# Patient Record
Sex: FEMALE | Race: WHITE | NOT HISPANIC OR LATINO | Employment: OTHER | URBAN - METROPOLITAN AREA
[De-identification: names, ages, dates, MRNs, and addresses within clinical notes are randomized per-mention and may not be internally consistent; named-entity substitution may affect disease eponyms.]

---

## 2017-01-25 NOTE — TELEPHONE ENCOUNTER
----- Message from Lucy Salgado sent at 1/25/2017  9:42 AM CST -----  Patient requesting a Rx for Eloquis. States her pharmacy is advising her PCP will no longer fill this medication.    Pt use..    [facility] - [facility] [address] [place] [id number] [address] [address]  Phone: [phone] Fax: [phone]    Please adv/call [phone].//thanks. cw

## 2017-04-19 DIAGNOSIS — G45.9 TRANSIENT CEREBRAL ISCHEMIA, UNSPECIFIED TYPE: Primary | ICD-10-CM

## 2017-04-19 DIAGNOSIS — G45.1 CAROTID ARTERY SYNDROME: ICD-10-CM

## 2017-04-20 ENCOUNTER — CLINICAL SUPPORT (OUTPATIENT)
Dept: CARDIOLOGY | Facility: CLINIC | Age: 77
End: 2017-04-20
Payer: MEDICARE

## 2017-04-20 ENCOUNTER — OFFICE VISIT (OUTPATIENT)
Dept: CARDIOLOGY | Facility: CLINIC | Age: 77
End: 2017-04-20
Payer: MEDICARE

## 2017-04-20 VITALS
BODY MASS INDEX: 26.41 KG/M2 | HEART RATE: 68 BPM | SYSTOLIC BLOOD PRESSURE: 150 MMHG | WEIGHT: 149.06 LBS | DIASTOLIC BLOOD PRESSURE: 60 MMHG | HEIGHT: 63 IN

## 2017-04-20 DIAGNOSIS — Z45.010 PACEMAKER AT END OF BATTERY LIFE: Primary | ICD-10-CM

## 2017-04-20 DIAGNOSIS — G45.1 CAROTID ARTERY SYNDROME: ICD-10-CM

## 2017-04-20 DIAGNOSIS — I67.3: ICD-10-CM

## 2017-04-20 DIAGNOSIS — I48.0 PAROXYSMAL ATRIAL FIBRILLATION: Chronic | ICD-10-CM

## 2017-04-20 DIAGNOSIS — E78.5 DYSLIPIDEMIA: Chronic | ICD-10-CM

## 2017-04-20 DIAGNOSIS — G45.9 TRANSIENT CEREBRAL ISCHEMIA, UNSPECIFIED TYPE: ICD-10-CM

## 2017-04-20 DIAGNOSIS — E78.5 HYPERLIPIDEMIA, UNSPECIFIED HYPERLIPIDEMIA TYPE: ICD-10-CM

## 2017-04-20 DIAGNOSIS — R06.09 DOE (DYSPNEA ON EXERTION): ICD-10-CM

## 2017-04-20 DIAGNOSIS — I63.81: ICD-10-CM

## 2017-04-20 DIAGNOSIS — Z95.0 CARDIAC PACEMAKER IN SITU: ICD-10-CM

## 2017-04-20 DIAGNOSIS — I48.0 PAF (PAROXYSMAL ATRIAL FIBRILLATION): ICD-10-CM

## 2017-04-20 DIAGNOSIS — Z79.01 ANTICOAGULATED: ICD-10-CM

## 2017-04-20 DIAGNOSIS — I16.0 HYPERTENSIVE URGENCY: ICD-10-CM

## 2017-04-20 DIAGNOSIS — I10 ESSENTIAL (PRIMARY) HYPERTENSION: Chronic | ICD-10-CM

## 2017-04-20 PROCEDURE — 1126F AMNT PAIN NOTED NONE PRSNT: CPT | Mod: S$GLB,,, | Performed by: PHYSICIAN ASSISTANT

## 2017-04-20 PROCEDURE — 1160F RVW MEDS BY RX/DR IN RCRD: CPT | Mod: S$GLB,,, | Performed by: PHYSICIAN ASSISTANT

## 2017-04-20 PROCEDURE — 93280 PM DEVICE PROGR EVAL DUAL: CPT | Mod: 26,,, | Performed by: INTERNAL MEDICINE

## 2017-04-20 PROCEDURE — 1159F MED LIST DOCD IN RCRD: CPT | Mod: S$GLB,,, | Performed by: PHYSICIAN ASSISTANT

## 2017-04-20 PROCEDURE — 93000 ELECTROCARDIOGRAM COMPLETE: CPT | Mod: S$GLB,,, | Performed by: INTERNAL MEDICINE

## 2017-04-20 PROCEDURE — 3078F DIAST BP <80 MM HG: CPT | Mod: S$GLB,,, | Performed by: PHYSICIAN ASSISTANT

## 2017-04-20 PROCEDURE — 3077F SYST BP >= 140 MM HG: CPT | Mod: S$GLB,,, | Performed by: PHYSICIAN ASSISTANT

## 2017-04-20 PROCEDURE — 99999 PR PBB SHADOW E&M-EST. PATIENT-LVL III: CPT | Mod: PBBFAC,,, | Performed by: PHYSICIAN ASSISTANT

## 2017-04-20 PROCEDURE — 99215 OFFICE O/P EST HI 40 MIN: CPT | Mod: S$GLB,,, | Performed by: PHYSICIAN ASSISTANT

## 2017-04-20 RX ORDER — NITROFURANTOIN MACROCRYSTALS 50 MG/1
CAPSULE ORAL
Refills: 5 | COMMUNITY
Start: 2017-04-10 | End: 2019-02-25 | Stop reason: ALTCHOICE

## 2017-04-20 NOTE — PROGRESS NOTES
"Subjective:    Patient ID:  Tatiana Chaparro is a 76 y.o. female who presents for follow-up of PAF, HTN, hyperlipidemia    HPI   Ms. Chaparro is a 76 year old female patient with a PMHx ? Binswanger's disease, HTN, PAF (on Eliquis), and dyslipidemia who presents today for routine follow-up. Patient returns today and states she is doing ok. She complained of recent increased BROOKE over the past 2-3 months. She has noticed a decline in her energy level, states she gets tired and "winded" even doing housework. Associated symptoms include occasional palpitations. Patient denies any associated zaria chest pain or tightness. No lightheadedness, dizziness, near syncope, or syncope. She reports compliance with her medications. Remains on Eliquis for CVA prophylaxis. No bleeding issues. No stroke like symptoms. Device interrogation today revealed AMRGIE (since 11/16). Discussed with EP specialist, Dr. Dias in clinic. Will stop atenolol to give buffer for underlying rhythm and plan for generator change Thursday, 4/27/16.      Review of Systems   Constitution: Positive for malaise/fatigue. Negative for chills, decreased appetite, fever and weakness.   HENT: Negative for congestion, headaches, hoarse voice and sore throat.    Eyes: Negative for blurred vision and discharge.   Cardiovascular: Positive for dyspnea on exertion. Negative for chest pain, claudication, cyanosis, irregular heartbeat, leg swelling, near-syncope, orthopnea, palpitations and paroxysmal nocturnal dyspnea.   Respiratory: Positive for shortness of breath. Negative for cough, hemoptysis, snoring, sputum production and wheezing.    Endocrine: Negative for cold intolerance and heat intolerance.   Hematologic/Lymphatic: Negative for bleeding problem. Does not bruise/bleed easily.   Skin: Negative for rash.   Musculoskeletal: Negative for arthritis, back pain, joint pain, joint swelling, muscle cramps, muscle weakness and myalgias.   Gastrointestinal: Negative " "for abdominal pain, constipation, diarrhea, heartburn, melena and nausea.   Genitourinary: Negative for hematuria.   Neurological: Negative for dizziness, focal weakness, light-headedness, loss of balance, numbness, paresthesias and seizures.   Psychiatric/Behavioral: Negative for memory loss. The patient does not have insomnia.    Allergic/Immunologic: Negative for hives.       BP (!) 150/60 (BP Location: Right arm, Patient Position: Sitting, BP Method: Manual)  Pulse 68 Comment: radial  Ht 5' 3" (1.6 m)  Wt 67.6 kg (149 lb 0.5 oz)  BMI 26.4 kg/m2    Objective:    Physical Exam   Constitutional: She is oriented to person, place, and time. She appears well-developed and well-nourished. No distress.   HENT:   Head: Normocephalic and atraumatic.   Eyes: Pupils are equal, round, and reactive to light. Right eye exhibits no discharge. Left eye exhibits no discharge.   Neck: Neck supple. No JVD present. No tracheal deviation present. No thyromegaly present.   Cardiovascular: Normal rate, S1 normal, S2 normal and normal heart sounds.  An irregularly irregular rhythm present. PMI is not displaced.  Exam reveals no gallop, no S3, no S4 and no friction rub.    No murmur heard.  Pulses:       Radial pulses are 2+ on the right side, and 2+ on the left side.   Pulmonary/Chest: Effort normal and breath sounds normal. No respiratory distress. She has no wheezes. She has no rales.   Ppm site well-healed   Abdominal: Soft. She exhibits no distension. There is no tenderness. There is no rebound.   Musculoskeletal: She exhibits no edema.   Neurological: She is alert and oriented to person, place, and time.   Skin: Skin is warm and dry. She is not diaphoretic. No erythema.   Psychiatric: She has a normal mood and affect. Her behavior is normal. Thought content normal.   Nursing note and vitals reviewed.        Lab Results   Component Value Date    CHOL 136 07/05/2016     Lab Results   Component Value Date    HDL 38 (L) 07/05/2016 "     Lab Results   Component Value Date    LDLCALC 66.4 07/05/2016     Lab Results   Component Value Date    TRIG 158 (H) 07/05/2016     Lab Results   Component Value Date    CHOLHDL 27.9 07/05/2016       Chemistry        Component Value Date/Time     07/04/2016 2225    K 4.0 07/04/2016 2225     07/04/2016 2225    CO2 25 07/04/2016 2225    BUN 39 (H) 07/04/2016 2225    CREATININE 1.4 07/04/2016 2225     (H) 07/04/2016 2225        Component Value Date/Time    CALCIUM 10.1 07/04/2016 2225    ALKPHOS 74 07/04/2016 2225    AST 23 07/04/2016 2225    ALT 29 07/04/2016 2225    BILITOT 0.3 07/04/2016 2225        Lab Results   Component Value Date    HGBA1C 7.2 (H) 07/05/2016       Assessment:       1. Pacemaker at end of battery life    2. Binswanger's disease    3. Lacunar stroke of right subthalamic region    4. Transient cerebral ischemia, unspecified type    5. Essential (primary) hypertension    6. Hypertensive urgency    7. Paroxysmal atrial fibrillation    8. Dyslipidemia    9. Hyperlipidemia, unspecified hyperlipidemia type    10. Anticoagulated    11. BROOKE (dyspnea on exertion)       Patient who presents for routine follow-up, device check subsequently revealed MARGIE, needs generator change. Discussed with Dr. Dias, will stop atenolol and plan for generator change 4/27/17. Patient also with BROOKE and fatigue, may be related to pacemaker issue whoever given risk factors and history, needs MPI and stress test post-generator change. Continue current meds in meantime. Patient also with history of transient cerebral ischemia/ lacunar stroke. Generally we stop Eliquis pre-procedure, will make sure Dr. Dias is aware of stroke history and defer to him. Patient advised to go to ED with any concerning symptoms.   Plan:   -Needs PPM generator change as device if MARGIE, planned for 4/27/17  -Stop atenolol  -Continue other meds, defer timeline of stopping Eliquis to Dr. Dias  -Stress test and 2D echo  post-generator change    Chart reviewed. Dr. Riley agrees with plan as outlined above.

## 2017-04-20 NOTE — MR AVS SNAPSHOT
[facility]  [id number] [place] [id number]  Phone: [phone]  Fax: [phone]                  Tatiana Chaparro   2017 1:30 PM   Office Visit    Description:  Female : 1940   Provider:  AMALIA Moon   Department:  [facility]                To Do List           Goals (5 Years of Data)     None      [facility] On Call     [facility] On Call Nurse Care Line -  Assistance  Unless otherwise directed by your provider, please contact [facility] On-Call, our nurse care line that is available for  assistance.     Registered nurses in the [facility] On Call Center provide: appointment scheduling, clinical advisement, health education, and other advisory services.  Call: [phone] (toll free)               Medications           Message regarding Medications     Verify the changes and/or additions to your medication regime listed below are the same as discussed with your clinician today.  If any of these changes or additions are incorrect, please notify your healthcare provider.             Verify that the below list of medications is an accurate representation of the medications you are currently taking.  If none reported, the list may be blank. If incorrect, please contact your healthcare provider. Carry this list with you in case of emergency.           Current Medications     alprazolam (XANAX) 0.5 MG tablet Take 0.5 mg by mouth every evening.     apixaban 5 mg Tab Take 1 tablet (5 mg total) by mouth 2 (two) times daily.    atenolol (TENORMIN) 25 MG tablet Take 1 tablet (25 mg total) by mouth once daily.    atorvastatin (LIPITOR) 10 MG tablet Take 10 mg by mouth once daily.    gabapentin (NEURONTIN) 100 MG capsule Take 100 mg by mouth 2 (two) times daily. Takes one tablet by mouth in the morning and two tablets at night    hydrochlorothiazide (HYDRODIURIL) 25 MG tablet Take 25 mg by mouth once daily.    metformin (GLUCOPHAGE) 500 MG tablet Take 500  "mg by mouth 2 (two) times daily with meals.    nitrofurantoin (MACRODANTIN) 50 MG capsule TAKE ONE CAPSULE BY MOUTH AT BEDTIME THANK YOU    quinapril (ACCUPRIL) 40 MG tablet Take 40 mg by mouth 2 (two) times daily.           Clinical Reference Information           Your Vitals Were     BP Pulse Height Weight BMI    150/60 (BP Location: Right arm, Patient Position: Sitting, BP Method: Manual) 68 5' 3" (1.6 m) 67.6 kg (149 lb 0.5 oz) 26.4 kg/m2      Blood Pressure          Most Recent Value    BP  (!)  150/60      Allergies as of 4/20/2017     Codeine    Morphine      Immunizations Administered on Date of Encounter - 4/20/2017     None      [facility] Sign-Up     Activating your [facility] account is as easy as 1-2-3!     1) Visit [email or web address], select Sign Up Now, enter this activation code and your date of birth, then select Next.  [email or web address]  Expires: 6/4/2017  2:16 PM      2) Create a username and password to use when you visit [facility] in the future and select a security question in case you lose your password and select Next.    3) Enter your e-mail address and click Sign Up!    Additional Information  If you have questions, please e-mail [email or web address] or call [phone] to talk to our [facility] staff. Remember, [facility] is NOT to be used for urgent needs. For medical emergencies, dial 911.         Language Assistance Services     ATTENTION: Language assistance services are available, free of charge. Please call [phone].      ATENCIÓN: Si habla español, tiene a franklin disposición servicios gratuitos de asistencia lingüística. Llame al [phone].     [facility] Ý: N?u b?n nói Ti?ng Vi?t, có các d?ch v? h? tr? ngôn ng? mi?n phí dành cho b?n. G?i s? [phone].         [facility] complies with applicable Federal civil rights laws and does not discriminate on the basis of race, color, national origin, age, disability, or sex.        "

## 2017-04-21 PROBLEM — Z45.010 PACEMAKER AT END OF BATTERY LIFE: Status: ACTIVE | Noted: 2017-04-21

## 2017-04-21 PROBLEM — R06.09 DOE (DYSPNEA ON EXERTION): Status: ACTIVE | Noted: 2017-04-21

## 2017-04-24 ENCOUNTER — TELEPHONE (OUTPATIENT)
Dept: CARDIOLOGY | Facility: CLINIC | Age: 77
End: 2017-04-24

## 2017-04-24 DIAGNOSIS — Z45.010 PACEMAKER GENERATOR END OF LIFE: Primary | ICD-10-CM

## 2017-04-24 DIAGNOSIS — I48.0 PAF (PAROXYSMAL ATRIAL FIBRILLATION): ICD-10-CM

## 2017-04-25 ENCOUNTER — LAB VISIT (OUTPATIENT)
Dept: LAB | Facility: HOSPITAL | Age: 77
End: 2017-04-25
Attending: INTERNAL MEDICINE
Payer: MEDICARE

## 2017-04-25 DIAGNOSIS — Z45.010 PACEMAKER GENERATOR END OF LIFE: ICD-10-CM

## 2017-04-25 DIAGNOSIS — I48.0 PAF (PAROXYSMAL ATRIAL FIBRILLATION): ICD-10-CM

## 2017-04-25 LAB
ANION GAP SERPL CALC-SCNC: 11 MMOL/L
APTT BLDCRRT: 24.9 SEC
BASOPHILS # BLD AUTO: 0.03 K/UL
BASOPHILS NFR BLD: 0.4 %
BUN SERPL-MCNC: 27 MG/DL
CALCIUM SERPL-MCNC: 9.8 MG/DL
CHLORIDE SERPL-SCNC: 102 MMOL/L
CO2 SERPL-SCNC: 26 MMOL/L
CREAT SERPL-MCNC: 1.1 MG/DL
DIFFERENTIAL METHOD: ABNORMAL
EOSINOPHIL # BLD AUTO: 0.2 K/UL
EOSINOPHIL NFR BLD: 2.2 %
ERYTHROCYTE [DISTWIDTH] IN BLOOD BY AUTOMATED COUNT: 13.3 %
EST. GFR  (AFRICAN AMERICAN): 55.6 ML/MIN/1.73 M^2
EST. GFR  (NON AFRICAN AMERICAN): 48.2 ML/MIN/1.73 M^2
GLUCOSE SERPL-MCNC: 124 MG/DL
HCT VFR BLD AUTO: 37.7 %
HGB BLD-MCNC: 12.5 G/DL
INR PPP: 1.1
LYMPHOCYTES # BLD AUTO: 2.1 K/UL
LYMPHOCYTES NFR BLD: 25.6 %
MCH RBC QN AUTO: 31.5 PG
MCHC RBC AUTO-ENTMCNC: 33.2 %
MCV RBC AUTO: 95 FL
MONOCYTES # BLD AUTO: 0.8 K/UL
MONOCYTES NFR BLD: 9.7 %
NEUTROPHILS # BLD AUTO: 5.1 K/UL
NEUTROPHILS NFR BLD: 61.5 %
PLATELET # BLD AUTO: 258 K/UL
PMV BLD AUTO: 11.2 FL
POTASSIUM SERPL-SCNC: 4 MMOL/L
PROTHROMBIN TIME: 11.2 SEC
RBC # BLD AUTO: 3.97 M/UL
SODIUM SERPL-SCNC: 139 MMOL/L
WBC # BLD AUTO: 8.28 K/UL

## 2017-04-25 PROCEDURE — 85025 COMPLETE CBC W/AUTO DIFF WBC: CPT

## 2017-04-25 PROCEDURE — 85730 THROMBOPLASTIN TIME PARTIAL: CPT

## 2017-04-25 PROCEDURE — 36415 COLL VENOUS BLD VENIPUNCTURE: CPT

## 2017-04-25 PROCEDURE — 80048 BASIC METABOLIC PNL TOTAL CA: CPT

## 2017-04-25 PROCEDURE — 85610 PROTHROMBIN TIME: CPT

## 2017-04-26 DIAGNOSIS — I48.0 PAF (PAROXYSMAL ATRIAL FIBRILLATION): ICD-10-CM

## 2017-04-26 DIAGNOSIS — Z45.010 PACEMAKER GENERATOR END OF LIFE: Primary | ICD-10-CM

## 2017-04-26 NOTE — TELEPHONE ENCOUNTER
----- Message from Kendrick Dias MD sent at 4/26/2017  1:58 PM CDT -----  Correct. Moderate sedation ok. MB  ----- Message -----     From: Danelle Tello LPN     Sent: 4/26/2017   1:46 PM       To: Kendrick Dias MD    No anesthesia for this case MDT gen change 1PM tomorrow ?    Thanks, Danelle  ----- Message -----     From: AMALIA Moon     Sent: 4/21/2017   3:17 PM       To: Danelle Tello LPN

## 2017-04-26 NOTE — TELEPHONE ENCOUNTER
Telephoned patient on 4/24/17 and confirmed Generator change with Dr. Dias on Thursday scheduled lab work and reviewed Npo status, medications to hold prior to procedure and arrival time.

## 2017-04-27 ENCOUNTER — HOSPITAL ENCOUNTER (OUTPATIENT)
Facility: HOSPITAL | Age: 77
Discharge: HOME | End: 2017-04-27
Attending: INTERNAL MEDICINE | Admitting: INTERNAL MEDICINE
Payer: MEDICARE

## 2017-04-27 ENCOUNTER — SURGERY (OUTPATIENT)
Age: 77
End: 2017-04-27

## 2017-04-27 VITALS
OXYGEN SATURATION: 98 % | TEMPERATURE: 98 F | HEART RATE: 70 BPM | HEIGHT: 64 IN | WEIGHT: 153 LBS | DIASTOLIC BLOOD PRESSURE: 65 MMHG | SYSTOLIC BLOOD PRESSURE: 160 MMHG | RESPIRATION RATE: 17 BRPM | BODY MASS INDEX: 26.12 KG/M2

## 2017-04-27 DIAGNOSIS — I48.0 PAF (PAROXYSMAL ATRIAL FIBRILLATION): ICD-10-CM

## 2017-04-27 DIAGNOSIS — I49.8 SINUS ARRHYTHMIA: ICD-10-CM

## 2017-04-27 DIAGNOSIS — Z45.018 ENCOUNTER FOR ADJUSTMENT AND MANAGEMENT OF OTHER PART OF CARDIAC PACEMAKER: ICD-10-CM

## 2017-04-27 DIAGNOSIS — Z45.010 PACEMAKER GENERATOR END OF LIFE: ICD-10-CM

## 2017-04-27 PROCEDURE — 99152 MOD SED SAME PHYS/QHP 5/>YRS: CPT | Mod: ,,, | Performed by: INTERNAL MEDICINE

## 2017-04-27 PROCEDURE — 27200137 EP LAB PROCEDURE

## 2017-04-27 PROCEDURE — 63600175 PHARM REV CODE 636 W HCPCS: Performed by: INTERNAL MEDICINE

## 2017-04-27 PROCEDURE — 25000003 PHARM REV CODE 250

## 2017-04-27 PROCEDURE — 33228 REMV&REPLC PM GEN DUAL LEAD: CPT | Mod: ,,, | Performed by: INTERNAL MEDICINE

## 2017-04-27 PROCEDURE — 63600175 PHARM REV CODE 636 W HCPCS

## 2017-04-27 RX ORDER — CEFAZOLIN SODIUM 2 G/50ML
2 SOLUTION INTRAVENOUS
Status: COMPLETED | OUTPATIENT
Start: 2017-04-27 | End: 2017-04-27

## 2017-04-27 RX ORDER — OXYCODONE AND ACETAMINOPHEN 7.5; 325 MG/1; MG/1
1 TABLET ORAL EVERY 4 HOURS PRN
Qty: 15 TABLET | Refills: 0 | Status: SHIPPED | OUTPATIENT
Start: 2017-04-27 | End: 2017-05-07

## 2017-04-27 RX ORDER — CEPHALEXIN 500 MG/1
500 CAPSULE ORAL EVERY 8 HOURS
Qty: 12 CAPSULE | Refills: 0 | Status: SHIPPED | OUTPATIENT
Start: 2017-04-27 | End: 2017-05-01

## 2017-04-27 RX ORDER — ACETAMINOPHEN 325 MG/1
325 TABLET ORAL EVERY 6 HOURS PRN
Status: DISCONTINUED | OUTPATIENT
Start: 2017-04-27 | End: 2017-04-27 | Stop reason: HOSPADM

## 2017-04-27 RX ORDER — ONDANSETRON 2 MG/ML
4 INJECTION INTRAMUSCULAR; INTRAVENOUS ONCE
Status: COMPLETED | OUTPATIENT
Start: 2017-04-27 | End: 2017-04-27

## 2017-04-27 RX ADMIN — CEFAZOLIN SODIUM 2 G: 2 SOLUTION INTRAVENOUS at 01:04

## 2017-04-27 RX ADMIN — ONDANSETRON 4 MG: 2 INJECTION INTRAMUSCULAR; INTRAVENOUS at 03:04

## 2017-04-27 NOTE — DISCHARGE INSTRUCTIONS
ACTIVITY: Avoid heavy lifting or straining for 6 weeks          You may shower after 2 days          No tub bath until incision site is healed                     No reaching movements above the affected shoulder for 7 days                     Wear sling and swathe for ____ days.                        WOUND CARE: It is not unusual to have tenderness at the incision site. Remove the dressing as instructed by your physician.    DISCOMFORT: For general discomfort at the incision site, ou may take over the counter acetaminophen as directed on the bottle.    SIGNS AND SYMPTOMS TO REPORT: Call your physician for the following:          Fever greater than 101          Pain not relieved by medication          Swelling, drainage, warmth, or redness at incision site          Shortness of breath                     Hiccoughs within the first 48 hours that are persistent                     Increased fatigue with normal activity    WHEN TO SEEK EMERGENCY ASSISTANCE                      AFTER ICD CALL 911 FOR THE FOLLOWING:                     If you still have symptoms after shock                     If you have 3 or more shocks in a row                     If you have symptoms, but don't feel a shock, or if you feel faint or dizzy or                      Pass out, someone should call 911

## 2017-04-27 NOTE — H&P
[facility] -   History & Physical    Subjective:      Chief Complaint/Reason for Admission: PM at MARGIE    Tatiana Chaparro is a 76 y.o. female Binswanger's disease, HTN, PAF (on Eliquis), s/p DC PM here for PM generator change.  Device interrogation today revealed MARGIE (since 11/16). Stopped atenolol atenolol to give buffer for underlying rhythm. The patient denies interval chest pain, sob, palpitations, syncope, near syncope. Off eliquis since 4/24/17.         Past Medical History:   Diagnosis Date    Atrial fibrillation     Chronic back pain     Hypertension      Past Surgical History:   Procedure Laterality Date    APPENDECTOMY      CARDIAC PACEMAKER PLACEMENT      CHOLECYSTECTOMY      HYSTERECTOMY       History reviewed. No pertinent family history.  Social History   Substance Use Topics    Smoking status: Never Smoker    Smokeless tobacco: None    Alcohol use No       PTA Medications   Medication Sig    alprazolam (XANAX) 0.5 MG tablet Take 0.5 mg by mouth every evening.     atorvastatin (LIPITOR) 10 MG tablet Take 10 mg by mouth once daily.    gabapentin (NEURONTIN) 100 MG capsule Take 100 mg by mouth 2 (two) times daily. Takes one tablet by mouth in the morning and two tablets at night    hydrochlorothiazide (HYDRODIURIL) 25 MG tablet Take 25 mg by mouth once daily.    metformin (GLUCOPHAGE) 500 MG tablet Take 500 mg by mouth 2 (two) times daily with meals.    nitrofurantoin (MACRODANTIN) 50 MG capsule TAKE ONE CAPSULE BY MOUTH AT BEDTIME THANK YOU    quinapril (ACCUPRIL) 40 MG tablet Take 40 mg by mouth 2 (two) times daily.    apixaban 5 mg Tab Take 1 tablet (5 mg total) by mouth 2 (two) times daily.    atenolol (TENORMIN) 25 MG tablet Take 1 tablet (25 mg total) by mouth once daily.     Review of patient's allergies indicates:   Allergen Reactions    Codeine Itching    Morphine Itching        Review of Systems   Constitutional: Negative.    HENT: Negative.    Eyes:  Negative.    Respiratory: Negative.    Cardiovascular: Negative.    Gastrointestinal: Negative.    Genitourinary: Negative.    Musculoskeletal: Negative.    Skin: Negative.    Neurological: Negative.    Endo/Heme/Allergies: Negative.    Psychiatric/Behavioral: Negative.        Objective:      Vital Signs (Most Recent)  Temp: 97.7 °F (36.5 °C) (04/27/17 1205)  Pulse: 66 (04/27/17 1205)  Resp: 16 (04/27/17 1205)  BP: (!) 151/75 (04/27/17 1205)  SpO2: 99 % (04/27/17 1205)    Vital Signs Range (Last 24H):  Temp:  [97.7 °F (36.5 °C)]   Pulse:  [66]   Resp:  [16]   BP: (151)/(75)   SpO2:  [99 %]     Physical Exam   Constitutional: She is oriented to person, place, and time. She appears well-developed and well-nourished. No distress.   HENT:   Head: Normocephalic and atraumatic.   Mouth/Throat: No oropharyngeal exudate.   Eyes: EOM are normal. Pupils are equal, round, and reactive to light. Right eye exhibits no discharge. Left eye exhibits no discharge.   Neck: Normal range of motion. Neck supple. No JVD present. No thyromegaly present.   Cardiovascular: Normal rate and regular rhythm.    No murmur heard.  Pulmonary/Chest: Effort normal and breath sounds normal. No respiratory distress. She has no wheezes.   Abdominal: Soft. Bowel sounds are normal. She exhibits no distension and no mass. There is no tenderness. There is no guarding.   Musculoskeletal: Normal range of motion. She exhibits no edema.   Neurological: She is alert and oriented to person, place, and time. She has normal reflexes. No cranial nerve deficit. Coordination normal.   Skin: Skin is warm and dry. She is not diaphoretic. No erythema. No pallor.   Psychiatric: She has a normal mood and affect. Her behavior is normal. Judgment and thought content normal.   Vitals reviewed.      Data Review:    CBC:   Lab Results   Component Value Date    WBC 8.28 04/25/2017    RBC 3.97 (L) 04/25/2017    HGB 12.5 04/25/2017    HCT 37.7 04/25/2017     04/25/2017      BMP:   Lab Results   Component Value Date     (H) 04/25/2017     04/25/2017    K 4.0 04/25/2017     04/25/2017    CO2 26 04/25/2017    BUN 27 (H) 04/25/2017    CREATININE 1.1 04/25/2017    CALCIUM 9.8 04/25/2017     Coagulation:   Lab Results   Component Value Date    INR 1.1 04/25/2017    APTT 24.9 04/25/2017      ECG: AF with V pacing.     Assessment:      Active Hospital Problems    Diagnosis  POA    Pacemaker generator end of life [Z45.010]  Not Applicable     Priority: High      Resolved Hospital Problems    Diagnosis Date Resolved POA   No resolved problems to display.       Plan:    76 yoF AF s/p PM here for generator change due to MARGIE status. Extensive discussion regarding risks, benefits, and alternative approaches to the procedure was had with the patient.  The patient voices understanding and all questions have been answered.  The patient would like to proceed as planned.

## 2017-04-27 NOTE — IP AVS SNAPSHOT
[facility]  [id number] [place] [address] [address] [place] [id number]           Patient Discharge Instructions   Our goal is to set you up for success. This packet includes information on your condition, medications, and your home care.  It will help you care for yourself to prevent having to return to the hospital.     Please ask your nurse if you have any questions.      There are many details to remember when preparing to leave the hospital. Here is what you will need to do:    1. Take your medicine. If you are prescribed medications, review your Medication List on the following pages. You may have new medications to  at the pharmacy and others that you'll need to stop taking. Review the instructions for how and when to take your medications. Talk with your doctor or nurses if you are unsure of what to do.     2. Go to your follow-up appointments. Specific follow-up information is listed in the following pages. Your may be contacted by a nurse or clinical provider about future appointments. Be sure we have all of the phone numbers to reach you. Please contact your provider's office if you are unable to make an appointment.     3. Watch for warning signs. Your doctor or nurse will give you detailed warning signs to watch for and when to call for assistance. These instructions may also include educational information about your condition. If you experience any of warning signs to your health, call your doctor.           [facility]  Unless otherwise directed by your provider, please   contact [facility], our nurse care line   that is available for 24/7 assistance.     [phone] (toll-free)     Registered nurses in the [facility]   provide: appointment scheduling, clinical advisement, health education, and other advisory services.                  ** Verify the list of medication(s) below is accurate and up to date. Carry this with you in case of emergency. If your  medications have changed, please notify your healthcare provider.             Medication List      START taking these medications        Additional Info                      cephALEXin 500 MG capsule   Commonly known as:  KEFLEX   Quantity:  12 capsule   Refills:  0   Dose:  500 mg    Instructions:  Take 1 capsule (500 mg total) by mouth every 8 (eight) hours.     Begin Date    AM    Noon    PM    Bedtime       oxycodone-acetaminophen 7.5-325 mg per tablet   Commonly known as:  PERCOCET   Quantity:  15 tablet   Refills:  0   Dose:  1 tablet    Instructions:  Take 1 tablet by mouth every 4 (four) hours as needed for Pain.     Begin Date    AM    Noon    PM    Bedtime         ASK your doctor about these medications        Additional Info                      alprazolam 0.5 MG tablet   Commonly known as:  XANAX   Refills:  0   Dose:  0.5 mg    Instructions:  Take 0.5 mg by mouth every evening.     Begin Date    AM    Noon    PM    Bedtime       apixaban 5 mg Tab   Quantity:  60 tablet   Refills:  3   Dose:  5 mg    Instructions:  Take 1 tablet (5 mg total) by mouth 2 (two) times daily.     Begin Date    AM    Noon    PM    Bedtime       atenolol 25 MG tablet   Commonly known as:  TENORMIN   Quantity:  30 tablet   Refills:  1   Dose:  25 mg    Instructions:  Take 1 tablet (25 mg total) by mouth once daily.     Begin Date    AM    Noon    PM    Bedtime       atorvastatin 10 MG tablet   Commonly known as:  LIPITOR   Refills:  0   Dose:  10 mg    Instructions:  Take 10 mg by mouth once daily.     Begin Date    AM    Noon    PM    Bedtime       gabapentin 100 MG capsule   Commonly known as:  NEURONTIN   Refills:  0   Dose:  100 mg    Instructions:  Take 100 mg by mouth 2 (two) times daily. Takes one tablet by mouth in the morning and two tablets at night     Begin Date    AM    Noon    PM    Bedtime       hydrochlorothiazide 25 MG tablet   Commonly known as:  HYDRODIURIL   Refills:  0   Dose:  25 mg    Instructions:  Take  25 mg by mouth once daily.     Begin Date    AM    Noon    PM    Bedtime       metformin 500 MG tablet   Commonly known as:  GLUCOPHAGE   Refills:  0   Dose:  500 mg    Instructions:  Take 500 mg by mouth 2 (two) times daily with meals.     Begin Date    AM    Noon    PM    Bedtime       nitrofurantoin 50 MG capsule   Commonly known as:  MACRODANTIN   Refills:  5    Instructions:  TAKE ONE CAPSULE BY MOUTH AT BEDTIME THANK YOU     Begin Date    AM    Noon    PM    Bedtime       quinapril 40 MG tablet   Commonly known as:  ACCUPRIL   Refills:  0   Dose:  40 mg    Instructions:  Take 40 mg by mouth 2 (two) times daily.     Begin Date    AM    Noon    PM    Bedtime            Where to Get Your Medications      You can get these medications from any pharmacy     Bring a paper prescription for each of these medications     cephALEXin 500 MG capsule    oxycodone-acetaminophen 7.5-325 mg per tablet                  Please bring to all follow up appointments:    1. A copy of your discharge instructions.  2. All medicines you are currently taking in their original bottles.  3. Identification and insurance card.    Please arrive 15 minutes ahead of scheduled appointment time.    Please call 24 hours in advance if you must reschedule your appointment and/or time.        Your Scheduled Appointments     May 01, 2017 11:00 AM CDT   Pacemaker Wound Check with [facility]   [facility] ([facility])    [id number] [place] [address] , [address]  [place] [address] [phone]              Follow-up Information     Follow up with [facility] In 2 days.    Why:  For wound re-check          Discharge Instructions           ACTIVITY: Avoid heavy lifting or straining for 6 weeks          You may shower after 2 days          No tub bath until incision site is healed                     No reaching movements above the affected shoulder for 7 days                     Wear  "sling and swathe for ____ days.                        WOUND CARE: It is not unusual to have tenderness at the incision site. Remove the dressing as instructed by your physician.    DISCOMFORT: For general discomfort at the incision site, ou may take over the counter acetaminophen as directed on the bottle.    SIGNS AND SYMPTOMS TO REPORT: Call your physician for the following:          Fever greater than 101          Pain not relieved by medication          Swelling, drainage, warmth, or redness at incision site          Shortness of breath                     Hiccoughs within the first 48 hours that are persistent                     Increased fatigue with normal activity    WHEN TO SEEK EMERGENCY ASSISTANCE                      AFTER ICD CALL 911 FOR THE FOLLOWING:                     If you still have symptoms after shock                     If you have 3 or more shocks in a row                     If you have symptoms, but don't feel a shock, or if you feel faint or dizzy or                      Pass out, someone should call 911                                                         Admission Information     Date & Time Provider Department CSN    4/27/2017 11:34 AM Kendrick Dias MD [facility] -  [id number]      Care Providers     Provider Role Specialty Primary office phone    Kendrick Dias MD Attending Provider Electrophysiology [phone]    Kendrick Dias MD Surgeon  Electrophysiology [phone]      Your Vitals Were     BP Pulse Temp Resp Height Weight    165/59 74 97.8 °F (36.6 °C) (Oral) 10 5' 3.5" (1.613 m) 69.4 kg (153 lb)    SpO2 BMI             95% 26.68 kg/m2         Recent Lab Values        7/5/2016                           2:18 AM           A1C 7.2 (H)                       Allergies as of 4/27/2017        Reactions    Codeine Itching    Morphine Itching      Advance Directives     An advance directive is a document which, in the event you are no longer able to " make decisions for yourself, tells your healthcare team what kind of treatment you do or do not want to receive, or who you would like to make those decisions for you.  If you do not currently have an advance directive, [facility] encourages you to create one.  For more information call:  [phone]), [phone] ([phone]),  or log on to [email or web address].        Language Assistance Services     ATTENTION: Language assistance services are available, free of charge. Please call [phone].      ATENCIÓN: Si habla español, tiene a franklin disposición servicios gratuitos de asistencia lingüística. Llame al [phone].     CHÚ Ý: N?u b?n nói Ti?ng Vi?t, có các d?ch v? h? tr? ngôn ng? mi?n phí dành cho b?n. G?i s? [phone].        Stroke Education              Diabetes Discharge Instructions                                   Eliquis Informaiton           [facility] Sign-Up     Activating your [facility] account is as easy as 1-2-3!     1) Visit [email or web address], select Sign Up Now, enter this activation code and your date of birth, then select Next.  [email or web address]  Expires: 6/4/2017  2:16 PM      2) Create a username and password to use when you visit [facility] in the future and select a security question in case you lose your password and select Next.    3) Enter your e-mail address and click Sign Up!    Additional Information  If you have questions, please e-mail [email or web address] or call [phone] to talk to our [facility] staff. Remember, [facility] is NOT to be used for urgent needs. For medical emergencies, dial 911.          [facility] complies with applicable Federal civil rights laws and does not discriminate on the basis of race, color, national origin, age, disability, or sex.

## 2017-04-27 NOTE — BRIEF OP NOTE
: Kendrick Dias    Post-operative Diagnosis: S/p dual chamber PM generator change    Procedure Performed: DC PM generator change      Description of Procedure: After the right upper chest was infiltrated with lidocaine,the pre-pectoral pocket was opened. The existing generator was freed from the pocket. The old generator was removed and the new generator was attached to the leads.  Adequate lead parameters were obtained. The generator was placed in the pocket and the pocket was washed with antibiotic solution. The wound was closed with 2 layers. Medical adhesive was used to approximate the skin. There were no complications.       EBL: <50 mL    Specimens Removed: None    Patient is s/p dual chamber PM generator change. She will need the followin. Keflex 500 mg TID for 4 days at discharge   2. No lifting over 5 pounds for one week   3. Patient may shower in 48 hours, do not let beam of shower hit site directly and no scrubbing in area   4. Dressing will be removed in AM by EP   5. Follow up in device clinic in 1-2 weeks and with Dr. Kendrick Dias in 3 months.    Restart eliquis 17  Restart atenolol 17

## 2017-04-27 NOTE — DISCHARGE SUMMARY
[facility] -   Discharge Summary      Admit Date: 4/27/2017    Discharge Date and Time:        Attending Physician: Kendrick Dias MD     Reason for Admission: DC PM generator at [facility]    Procedures Performed: Procedure(s) (LRB):  REPLACEMENT-GENERATOR-PACEMAKER (Left)    Hospital Course (synopsis of major diagnoses, care, treatment, and services provided during the course of the hospital stay): DC PM generator changed without complication. Resume atenolol today and eliquis on 5/1/17.      Consults: none    Significant Diagnostic Studies: Labs:   BMP: No results for input(s): GLU, NA, K, CL, CO2, BUN, CREATININE, CALCIUM, MG in the last 48 hours., CMP No results for input(s): NA, K, CL, CO2, GLU, BUN, CREATININE, CALCIUM, PROT, ALBUMIN, BILITOT, ALKPHOS, AST, ALT, ANIONGAP, ESTGFRAFRICA, EGFRNONAA in the last 48 hours. and INR   Lab Results   Component Value Date    INR 1.1 04/25/2017    INR 1.0 07/04/2016    INR 1.0 08/10/2015       Final Diagnoses:    Principal Problem: <principal problem not specified>   Secondary Diagnoses:   Active Hospital Problems    Diagnosis  POA    Pacemaker generator end of life [Z45.010]  Not Applicable     Priority: High    Sinus arrhythmia [I49.8]  Yes      Resolved Hospital Problems    Diagnosis Date Resolved POA   No resolved problems to display.       Discharged Condition: good    Disposition: Home or Self Care    Follow Up/Patient Instructions:     Medications:  Reconciled Home Medications:   Current Discharge Medication List      START taking these medications    Details   cephALEXin (KEFLEX) 500 MG capsule Take 1 capsule (500 mg total) by mouth every 8 (eight) hours.  Qty: 12 capsule, Refills: 0      oxycodone-acetaminophen (PERCOCET) 7.5-325 mg per tablet Take 1 tablet by mouth every 4 (four) hours as needed for Pain.  Qty: 15 tablet, Refills: 0         CONTINUE these medications which have NOT CHANGED    Details   alprazolam (XANAX) 0.5 MG tablet Take 0.5 mg  by mouth every evening.       atorvastatin (LIPITOR) 10 MG tablet Take 10 mg by mouth once daily.      gabapentin (NEURONTIN) 100 MG capsule Take 100 mg by mouth 2 (two) times daily. Takes one tablet by mouth in the morning and two tablets at night      hydrochlorothiazide (HYDRODIURIL) 25 MG tablet Take 25 mg by mouth once daily.      metformin (GLUCOPHAGE) 500 MG tablet Take 500 mg by mouth 2 (two) times daily with meals.      nitrofurantoin (MACRODANTIN) 50 MG capsule TAKE ONE CAPSULE BY MOUTH AT BEDTIME THANK YOU  Refills: 5      quinapril (ACCUPRIL) 40 MG tablet Take 40 mg by mouth 2 (two) times daily.      apixaban 5 mg Tab Take 1 tablet (5 mg total) by mouth 2 (two) times daily.  Qty: 60 tablet, Refills: 3   START 5/1/17       atenolol (TENORMIN) 25 MG tablet Take 1 tablet (25 mg total) by mouth once daily.  Qty: 30 tablet, Refills: 1  START 4/27/17           No discharge procedures on file.  Follow-up Information     Follow up with [facility] In 2 days.    Why:  For wound re-check

## 2017-05-01 ENCOUNTER — CLINICAL SUPPORT (OUTPATIENT)
Dept: CARDIOLOGY | Facility: CLINIC | Age: 77
End: 2017-05-01
Payer: MEDICARE

## 2017-05-01 ENCOUNTER — OFFICE VISIT (OUTPATIENT)
Dept: CARDIOLOGY | Facility: CLINIC | Age: 77
End: 2017-05-01
Payer: MEDICARE

## 2017-05-01 VITALS
SYSTOLIC BLOOD PRESSURE: 170 MMHG | HEART RATE: 62 BPM | DIASTOLIC BLOOD PRESSURE: 60 MMHG | WEIGHT: 153.25 LBS | HEIGHT: 64 IN | BODY MASS INDEX: 26.16 KG/M2

## 2017-05-01 DIAGNOSIS — I48.0 PAF (PAROXYSMAL ATRIAL FIBRILLATION): ICD-10-CM

## 2017-05-01 DIAGNOSIS — Z95.0 CARDIAC PACEMAKER IN SITU: ICD-10-CM

## 2017-05-01 DIAGNOSIS — Z45.010 PACEMAKER GENERATOR END OF LIFE: Primary | ICD-10-CM

## 2017-05-01 PROCEDURE — 1159F MED LIST DOCD IN RCRD: CPT | Mod: S$GLB,,, | Performed by: NURSE PRACTITIONER

## 2017-05-01 PROCEDURE — 3077F SYST BP >= 140 MM HG: CPT | Mod: S$GLB,,, | Performed by: NURSE PRACTITIONER

## 2017-05-01 PROCEDURE — 3078F DIAST BP <80 MM HG: CPT | Mod: S$GLB,,, | Performed by: NURSE PRACTITIONER

## 2017-05-01 PROCEDURE — 99999 PR PBB SHADOW E&M-EST. PATIENT-LVL II: CPT | Mod: PBBFAC,,, | Performed by: NURSE PRACTITIONER

## 2017-05-01 PROCEDURE — 93280 PM DEVICE PROGR EVAL DUAL: CPT | Mod: 26,,, | Performed by: INTERNAL MEDICINE

## 2017-05-01 PROCEDURE — 1126F AMNT PAIN NOTED NONE PRSNT: CPT | Mod: S$GLB,,, | Performed by: NURSE PRACTITIONER

## 2017-05-01 PROCEDURE — 99212 OFFICE O/P EST SF 10 MIN: CPT | Mod: S$GLB,,, | Performed by: NURSE PRACTITIONER

## 2017-05-01 NOTE — PROGRESS NOTES
Subjective:   Patient ID:  Tatiana Chaparro is a 76 y.o. female who presents for  Wound Check      HPI   Patient presents to clinic for wound check after undergoing generator change. PPM initially implanted for non reversible symptomatic bradycardia due to sinus node dysfunction. She feels good since change. She no longer feels sluggish as she did before generator change. She reports occasional palpitations, but are brief. Is scheduled for device clinic today. Has no CNS complaints to suggest TIA or CVA. No symptoms to suggest CHF. No abnormal bleeding on Eliquis.  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    Carotid artery syndrome 7/5/2016    Chronic back pain     Essential (primary) hypertension     Hyperlipemia 7/18/2016    Hypertension     Lacunar stroke of right subthalamic region     OSVALDO (obstructive sleep apnea) 7/18/2016    Paroxysmal atrial fibrillation 7/4/2016    Transient cerebral ischemia 7/4/2016    Type 2 diabetes mellitus without complication        Past Surgical History:   Procedure Laterality Date    APPENDECTOMY      CARDIAC PACEMAKER PLACEMENT      CHOLECYSTECTOMY      HYSTERECTOMY         Social History   Substance Use Topics    Smoking status: Never Smoker    Smokeless tobacco: Not on file    Alcohol use No       No family history on file.    Current Outpatient Prescriptions   Medication Sig    alprazolam (XANAX) 0.5 MG tablet Take 0.5 mg by mouth every evening.     apixaban 5 mg Tab Take 1 tablet (5 mg total) by mouth 2 (two) times daily.    atenolol (TENORMIN) 25 MG tablet Take 1 tablet (25 mg total) by mouth once daily.    atorvastatin (LIPITOR) 10 MG tablet Take 10 mg by mouth once daily.    cephALEXin (KEFLEX) 500 MG capsule Take 1 capsule (500 mg total) by mouth every 8 (eight) hours.    gabapentin (NEURONTIN) 100 MG capsule Take 100 mg by mouth 2 (two)  times daily. Takes one tablet by mouth in the morning and two tablets at night    hydrochlorothiazide (HYDRODIURIL) 25 MG tablet Take 25 mg by mouth once daily.    metformin (GLUCOPHAGE) 500 MG tablet Take 500 mg by mouth 2 (two) times daily with meals.    nitrofurantoin (MACRODANTIN) 50 MG capsule TAKE ONE CAPSULE BY MOUTH AT BEDTIME THANK YOU    oxycodone-acetaminophen (PERCOCET) 7.5-325 mg per tablet Take 1 tablet by mouth every 4 (four) hours as needed for Pain.    quinapril (ACCUPRIL) 40 MG tablet Take 40 mg by mouth 2 (two) times daily.     No current facility-administered medications for this visit.      Current Outpatient Prescriptions on File Prior to Visit   Medication Sig    alprazolam (XANAX) 0.5 MG tablet Take 0.5 mg by mouth every evening.     apixaban 5 mg Tab Take 1 tablet (5 mg total) by mouth 2 (two) times daily.    atenolol (TENORMIN) 25 MG tablet Take 1 tablet (25 mg total) by mouth once daily.    atorvastatin (LIPITOR) 10 MG tablet Take 10 mg by mouth once daily.    cephALEXin (KEFLEX) 500 MG capsule Take 1 capsule (500 mg total) by mouth every 8 (eight) hours.    gabapentin (NEURONTIN) 100 MG capsule Take 100 mg by mouth 2 (two) times daily. Takes one tablet by mouth in the morning and two tablets at night    hydrochlorothiazide (HYDRODIURIL) 25 MG tablet Take 25 mg by mouth once daily.    metformin (GLUCOPHAGE) 500 MG tablet Take 500 mg by mouth 2 (two) times daily with meals.    nitrofurantoin (MACRODANTIN) 50 MG capsule TAKE ONE CAPSULE BY MOUTH AT BEDTIME THANK YOU    oxycodone-acetaminophen (PERCOCET) 7.5-325 mg per tablet Take 1 tablet by mouth every 4 (four) hours as needed for Pain.    quinapril (ACCUPRIL) 40 MG tablet Take 40 mg by mouth 2 (two) times daily.     No current facility-administered medications on file prior to visit.        Review of Systems   Constitution: Negative for decreased appetite, weakness, malaise/fatigue, weight gain and weight loss.   HENT:  "Negative for nosebleeds.    Cardiovascular: Positive for palpitations. Negative for chest pain, claudication, dyspnea on exertion, irregular heartbeat, leg swelling, near-syncope, orthopnea, paroxysmal nocturnal dyspnea and syncope.   Respiratory: Negative for cough, shortness of breath, sleep disturbances due to breathing, snoring and wheezing.    Hematologic/Lymphatic: Negative for bleeding problem. Does not bruise/bleed easily.   Skin: Negative for rash.   Musculoskeletal: Negative for arthritis, back pain, falls, joint pain, joint swelling, muscle cramps, muscle weakness and myalgias.   Gastrointestinal: Negative for bloating, abdominal pain, constipation, diarrhea, heartburn, nausea and vomiting.   Genitourinary: Negative for dysuria, hematuria and nocturia.   Neurological: Negative for excessive daytime sleepiness, dizziness, light-headedness, loss of balance, numbness, paresthesias and vertigo.       Objective:   Physical Exam   Constitutional: She is oriented to person, place, and time. She appears well-developed and well-nourished.   Cardiovascular: Normal rate, regular rhythm, normal heart sounds and normal pulses.  Exam reveals no friction rub.    No murmur heard.  Pulmonary/Chest: Effort normal and breath sounds normal. No respiratory distress. She has no wheezes. She has no rales.   Right PPM pocket WNL    Musculoskeletal: She exhibits edema (trace left pedal ).   Neurological: She is alert and oriented to person, place, and time.   Skin: Skin is warm and dry. No rash noted.   Psychiatric: She has a normal mood and affect. Her behavior is normal.   Nursing note and vitals reviewed.    Vitals:    05/01/17 0811   BP: (!) 170/60   BP Location: Right arm   Patient Position: Sitting   BP Method: Manual   Pulse: 62   Weight: 69.5 kg (153 lb 3.5 oz)   Height: 5' 3.5" (1.613 m)     Lab Results   Component Value Date    CHOL 136 07/05/2016     Lab Results   Component Value Date    HDL 38 (L) 07/05/2016     Lab " Results   Component Value Date    LDLCALC 66.4 07/05/2016     Lab Results   Component Value Date    TRIG 158 (H) 07/05/2016     Lab Results   Component Value Date    CHOLHDL 27.9 07/05/2016       Chemistry        Component Value Date/Time     04/25/2017 0805    K 4.0 04/25/2017 0805     04/25/2017 0805    CO2 26 04/25/2017 0805    BUN 27 (H) 04/25/2017 0805    CREATININE 1.1 04/25/2017 0805     (H) 04/25/2017 0805        Component Value Date/Time    CALCIUM 9.8 04/25/2017 0805    ALKPHOS 74 07/04/2016 2225    AST 23 07/04/2016 2225    ALT 29 07/04/2016 2225    BILITOT 0.3 07/04/2016 2225          Lab Results   Component Value Date    TSH 0.819 06/22/2015     Lab Results   Component Value Date    INR 1.1 04/25/2017    INR 1.0 07/04/2016    INR 1.0 08/10/2015     Lab Results   Component Value Date    WBC 8.28 04/25/2017    HGB 12.5 04/25/2017    HCT 37.7 04/25/2017    MCV 95 04/25/2017     04/25/2017     BMP  Sodium   Date Value Ref Range Status   04/25/2017 139 136 - 145 mmol/L Final     Potassium   Date Value Ref Range Status   04/25/2017 4.0 3.5 - 5.1 mmol/L Final     Chloride   Date Value Ref Range Status   04/25/2017 102 95 - 110 mmol/L Final     CO2   Date Value Ref Range Status   04/25/2017 26 23 - 29 mmol/L Final     BUN, Bld   Date Value Ref Range Status   04/25/2017 27 (H) 8 - 23 mg/dL Final     Creatinine   Date Value Ref Range Status   04/25/2017 1.1 0.5 - 1.4 mg/dL Final     Calcium   Date Value Ref Range Status   04/25/2017 9.8 8.7 - 10.5 mg/dL Final     Anion Gap   Date Value Ref Range Status   04/25/2017 11 8 - 16 mmol/L Final     eGFR if    Date Value Ref Range Status   04/25/2017 55.6 (A) >60 mL/min/1.73 m^2 Final     eGFR if non    Date Value Ref Range Status   04/25/2017 48.2 (A) >60 mL/min/1.73 m^2 Final     Comment:     Calculation used to obtain the estimated glomerular filtration  rate (eGFR) is the CKD-EPI equation. Since race is  unknown   in our information system, the eGFR values for   -American and Non--American patients are given   for each creatinine result.       Estimated Creatinine Clearance: 39.9 mL/min (based on Cr of 1.1).    Assessment:     1. Pacemaker generator end of life    BP is elevated today in clinic. Needs to log pressure  No CNS complaints to suggest TIA or CVA  No evidence of CHF  No abnormal bleeding on Eliquis     Plan:   Scheduled for device clinic today  Will log her BP for the next week and RTC for BP check in 1-2 weeks  Heart healthy diet

## 2017-05-01 NOTE — MR AVS SNAPSHOT
[facility]  [address] [address]  Phone: [phone]  Fax: [phone]                  Tatiana Chaparro   2017 8:00 AM   Office Visit    Description:  Female : 1940   Provider:  PRUDENCE Swan   Department:  [facility]           Reason for Visit     Wound Check           Diagnoses this Visit        Comments    Pacemaker generator end of life    -  Primary            To Do List           Future Appointments        Provider Department Dept Phone    2017 11:00 AM [facility] [phone]      Goals (5 Years of Data)     None      Follow-Up and Disposition     Return in about 2 weeks (around 5/15/2017) for BP check .      [facility]     [facility] On Call Nurse Care Line -  Assistance  Unless otherwise directed by your provider, please contact [facility], our nurse care line that is available for  assistance.     Registered nurses in the [facility] provide: appointment scheduling, clinical advisement, health education, and other advisory services.  Call: [phone] (toll free)               Medications           Message regarding Medications     Verify the changes and/or additions to your medication regime listed below are the same as discussed with your clinician today.  If any of these changes or additions are incorrect, please notify your healthcare provider.             Verify that the below list of medications is an accurate representation of the medications you are currently taking.  If none reported, the list may be blank. If incorrect, please contact your healthcare provider. Carry this list with you in case of emergency.           Current Medications     alprazolam (XANAX) 0.5 MG tablet Take 0.5 mg by mouth every evening.     apixaban 5 mg Tab Take 1 tablet (5 mg total) by mouth 2 (two) times daily.    atenolol (TENORMIN) 25 MG tablet Take 1  "tablet (25 mg total) by mouth once daily.    atorvastatin (LIPITOR) 10 MG tablet Take 10 mg by mouth once daily.    cephALEXin (KEFLEX) 500 MG capsule Take 1 capsule (500 mg total) by mouth every 8 (eight) hours.    gabapentin (NEURONTIN) 100 MG capsule Take 100 mg by mouth 2 (two) times daily. Takes one tablet by mouth in the morning and two tablets at night    hydrochlorothiazide (HYDRODIURIL) 25 MG tablet Take 25 mg by mouth once daily.    metformin (GLUCOPHAGE) 500 MG tablet Take 500 mg by mouth 2 (two) times daily with meals.    nitrofurantoin (MACRODANTIN) 50 MG capsule TAKE ONE CAPSULE BY MOUTH AT BEDTIME THANK YOU    oxycodone-acetaminophen (PERCOCET) 7.5-325 mg per tablet Take 1 tablet by mouth every 4 (four) hours as needed for Pain.    quinapril (ACCUPRIL) 40 MG tablet Take 40 mg by mouth 2 (two) times daily.           Clinical Reference Information           Your Vitals Were     BP Pulse Height Weight BMI    170/60 (BP Location: Right arm, Patient Position: Sitting, BP Method: Manual) 62 5' 3.5" (1.613 m) 69.5 kg (153 lb 3.5 oz) 26.72 kg/m2      Blood Pressure          Most Recent Value    BP  (!)  170/60      Allergies as of 5/1/2017     Codeine    Morphine    Iodine And Iodide Containing Products      Immunizations Administered on Date of Encounter - 5/1/2017     None      [facility] Sign-Up     Activating your [facility] account is as easy as 1-2-3!     1) Visit [email or web address], select Sign Up Now, enter this activation code and your date of birth, then select Next.  [email or web address]  Expires: 6/4/2017  2:16 PM      2) Create a username and password to use when you visit [facility] in the future and select a security question in case you lose your password and select Next.    3) Enter your e-mail address and click Sign Up!    Additional Information  If you have questions, please e-mail [email or web address] or call [phone] to talk to our [facility] staff. Remember, [facility] is NOT to be used for " urgent needs. For medical emergencies, dial 911.         Language Assistance Services     ATTENTION: Language assistance services are available, free of charge. Please call [phone].      ATENCIÓN: Si habla kelvin, tiene a franklin disposición servicios gratuitos de asistencia lingüística. Llame al [phone].     CHÚ Ý: N?u b?n nói Ti?ng Vi?t, có các d?ch v? h? tr? ngôn ng? mi?n phí dành cho b?n. G?i s? [phone].         [facility] complies with applicable Federal civil rights laws and does not discriminate on the basis of race, color, national origin, age, disability, or sex.

## 2017-05-07 ENCOUNTER — HOSPITAL ENCOUNTER (EMERGENCY)
Facility: HOSPITAL | Age: 77
Discharge: HOME | End: 2017-05-07
Attending: INTERNAL MEDICINE
Payer: MEDICARE

## 2017-05-07 VITALS
OXYGEN SATURATION: 97 % | BODY MASS INDEX: 26.12 KG/M2 | DIASTOLIC BLOOD PRESSURE: 60 MMHG | HEIGHT: 64 IN | SYSTOLIC BLOOD PRESSURE: 178 MMHG | RESPIRATION RATE: 16 BRPM | WEIGHT: 153 LBS | TEMPERATURE: 97 F | HEART RATE: 67 BPM

## 2017-05-07 DIAGNOSIS — R07.81 RIB PAIN ON RIGHT SIDE: ICD-10-CM
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
## 2017-05-07 DIAGNOSIS — Z45.010 PACEMAKER GENERATOR END OF LIFE: Primary | ICD-10-CM

## 2017-05-07 DIAGNOSIS — R52 PAIN: ICD-10-CM

## 2017-05-07 LAB
ALBUMIN SERPL BCP-MCNC: 3.8 G/DL
ALP SERPL-CCNC: 64 U/L
ALT SERPL W/O P-5'-P-CCNC: 53 U/L
ANION GAP SERPL CALC-SCNC: 13 MMOL/L
APTT BLDCRRT: 25 SEC
AST SERPL-CCNC: 51 U/L
BACTERIA #/AREA URNS HPF: NORMAL /HPF
BASOPHILS # BLD AUTO: 0.04 K/UL
BASOPHILS NFR BLD: 0.5 %
BILIRUB SERPL-MCNC: 0.6 MG/DL
BILIRUB UR QL STRIP: NEGATIVE
BUN SERPL-MCNC: 35 MG/DL
CALCIUM SERPL-MCNC: 10.3 MG/DL
CHLORIDE SERPL-SCNC: 102 MMOL/L
CLARITY UR: CLEAR
CO2 SERPL-SCNC: 27 MMOL/L
COLOR UR: YELLOW
CREAT SERPL-MCNC: 1.2 MG/DL
DIFFERENTIAL METHOD: ABNORMAL
EOSINOPHIL # BLD AUTO: 0.2 K/UL
EOSINOPHIL NFR BLD: 2.3 %
ERYTHROCYTE [DISTWIDTH] IN BLOOD BY AUTOMATED COUNT: 13.5 %
EST. GFR  (AFRICAN AMERICAN): 50 ML/MIN/1.73 M^2
EST. GFR  (NON AFRICAN AMERICAN): 43 ML/MIN/1.73 M^2
GLUCOSE SERPL-MCNC: 150 MG/DL
GLUCOSE UR QL STRIP: ABNORMAL
HCT VFR BLD AUTO: 38.6 %
HGB BLD-MCNC: 13 G/DL
HGB UR QL STRIP: NEGATIVE
INR PPP: 1.1
KETONES UR QL STRIP: NEGATIVE
LEUKOCYTE ESTERASE UR QL STRIP: ABNORMAL
LYMPHOCYTES # BLD AUTO: 1.7 K/UL
LYMPHOCYTES NFR BLD: 22.2 %
MCH RBC QN AUTO: 31.2 PG
MCHC RBC AUTO-ENTMCNC: 33.7 %
MCV RBC AUTO: 93 FL
MICROSCOPIC COMMENT: NORMAL
MONOCYTES # BLD AUTO: 0.6 K/UL
MONOCYTES NFR BLD: 7.3 %
NEUTROPHILS # BLD AUTO: 5.2 K/UL
NEUTROPHILS NFR BLD: 67.7 %
NITRITE UR QL STRIP: NEGATIVE
PH UR STRIP: 6 [PH] (ref 5–8)
PLATELET # BLD AUTO: 229 K/UL
PMV BLD AUTO: 10.5 FL
POTASSIUM SERPL-SCNC: 3.9 MMOL/L
PROT SERPL-MCNC: 7.8 G/DL
PROT UR QL STRIP: NEGATIVE
PROTHROMBIN TIME: 11.2 SEC
RBC # BLD AUTO: 4.17 M/UL
RBC #/AREA URNS HPF: 0 /HPF (ref 0–4)
SODIUM SERPL-SCNC: 142 MMOL/L
SP GR UR STRIP: <=1.005 (ref 1–1.03)
SQUAMOUS #/AREA URNS HPF: 3 /HPF
URN SPEC COLLECT METH UR: ABNORMAL
UROBILINOGEN UR STRIP-ACNC: NEGATIVE EU/DL
WBC # BLD AUTO: 7.67 K/UL
WBC #/AREA URNS HPF: 5 /HPF (ref 0–5)

## 2017-05-07 PROCEDURE — 81000 URINALYSIS NONAUTO W/SCOPE: CPT

## 2017-05-07 PROCEDURE — 85610 PROTHROMBIN TIME: CPT

## 2017-05-07 PROCEDURE — 93010 ELECTROCARDIOGRAM REPORT: CPT | Mod: S$GLB,,, | Performed by: INTERNAL MEDICINE

## 2017-05-07 PROCEDURE — 85730 THROMBOPLASTIN TIME PARTIAL: CPT

## 2017-05-07 PROCEDURE — 96374 THER/PROPH/DIAG INJ IV PUSH: CPT

## 2017-05-07 PROCEDURE — 80053 COMPREHEN METABOLIC PANEL: CPT

## 2017-05-07 PROCEDURE — 93005 ELECTROCARDIOGRAM TRACING: CPT

## 2017-05-07 PROCEDURE — 25000003 PHARM REV CODE 250: Performed by: INTERNAL MEDICINE

## 2017-05-07 PROCEDURE — 99284 EMERGENCY DEPT VISIT MOD MDM: CPT | Mod: 25

## 2017-05-07 PROCEDURE — 63600175 PHARM REV CODE 636 W HCPCS: Performed by: INTERNAL MEDICINE

## 2017-05-07 PROCEDURE — 85025 COMPLETE CBC W/AUTO DIFF WBC: CPT

## 2017-05-07 RX ORDER — OXYCODONE AND ACETAMINOPHEN 7.5; 325 MG/1; MG/1
1 TABLET ORAL
Qty: 6 TABLET | Refills: 0 | Status: SHIPPED | OUTPATIENT
Start: 2017-05-07 | End: 2017-05-16

## 2017-05-07 RX ORDER — ONDANSETRON 4 MG/1
4 TABLET, ORALLY DISINTEGRATING ORAL
Status: COMPLETED | OUTPATIENT
Start: 2017-05-07 | End: 2017-05-07

## 2017-05-07 RX ORDER — HYDROMORPHONE HYDROCHLORIDE 2 MG/ML
1 INJECTION, SOLUTION INTRAMUSCULAR; INTRAVENOUS; SUBCUTANEOUS
Status: COMPLETED | OUTPATIENT
Start: 2017-05-07 | End: 2017-05-07

## 2017-05-07 RX ADMIN — HYDROMORPHONE HYDROCHLORIDE 1 MG: 2 INJECTION, SOLUTION INTRAMUSCULAR; INTRAVENOUS; SUBCUTANEOUS at 11:05

## 2017-05-07 RX ADMIN — ONDANSETRON 4 MG: 4 TABLET, ORALLY DISINTEGRATING ORAL at 11:05

## 2017-05-07 NOTE — ED PROVIDER NOTES
"SCRIBE #1 NOTE: I, Yoel Carmen, am scribing for, and in the presence of, Allie Downey MD. I have scribed the entire note.      History      Chief Complaint   Patient presents with    Chest Pain     Pt states, "I fell last night and hit the right side of my ribs on a chest of drawers".       Review of patient's allergies indicates:   Allergen Reactions    Codeine Itching    Morphine Itching    Iodine and iodide containing products Rash        HPI   HPI    5/7/2017, 10:04 AM   History obtained from the patient      History of Present Illness: Tatiana Chaparro is a 76 y.o. female patient who presents to the Emergency Department for fall secondary to possible syncopal event which occurred last night. Pt states she fell and hit the right side of her ribs on chest drawers at home. Sx constant and moderate in severity. Worse with deep inspiration. No other modifying factors noted. She denies LOC, N/V, dizziness seizure activity, fever, head injury, focal weakness or numbness, abd pain, and all other sx. Pt had pacemaker battery replaced 1 week ago. No further concerns at this time.     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    Carotid artery syndrome 7/5/2016    Chronic back pain     Essential (primary) hypertension     Hyperlipemia 7/18/2016    Hypertension     Lacunar stroke of right subthalamic region     OSVALDO (obstructive sleep apnea) 7/18/2016    Paroxysmal atrial fibrillation 7/4/2016    Transient cerebral ischemia 7/4/2016    Type 2 diabetes mellitus without complication        Past Surgical History:  Past Surgical History:   Procedure Laterality Date    APPENDECTOMY      CARDIAC PACEMAKER PLACEMENT      CHOLECYSTECTOMY      HYSTERECTOMY           Family History:  No family history on file.    Social History:  Social " History     Social History Main Topics    Smoking status: Never Smoker    Smokeless tobacco: Not on file    Alcohol use No    Drug use: No    Sexual activity: Not on file       ROS   Review of Systems   Constitutional: Negative for chills and fever.   HENT: Negative for sore throat.    Eyes: Negative for visual disturbance.   Respiratory: Negative for cough and shortness of breath.    Gastrointestinal: Negative for abdominal pain, nausea and vomiting.   Genitourinary: Negative for dysuria.   Musculoskeletal: Negative for back pain, neck pain and neck stiffness.        - hip pain  - knee pain  + right rib pain   Skin: Negative for rash.   Neurological: Negative for dizziness, speech difficulty, weakness, numbness and headaches.        + no LOC  + questionable syncope   Hematological: Does not bruise/bleed easily.   All other systems reviewed and are negative.      Physical Exam    Initial Vitals   BP Pulse Resp Temp SpO2   05/07/17 0927 05/07/17 0927 05/07/17 0927 05/07/17 0927 05/07/17 0927   179/71 61 20 97.2 °F (36.2 °C) 96 %      Physical Exam  Nursing Notes and Vital Signs Reviewed.  Constitutional: Patient is in no acute distress. Awake and alert. Well-developed and well-nourished.  Head: Atraumatic. Normocephalic.  Eyes: PERRL. EOM intact. Conjunctivae are not pale. No scleral icterus.  ENT: Mucous membranes are moist. Oropharynx is clear and symmetric.    Neck: Supple. Full ROM. No lymphadenopathy.  Cardiovascular: Regular rate. Regular rhythm. No murmurs, rubs, or gallops. Distal pulses are 2+ and symmetric.   Pulmonary/Chest: No respiratory distress. Clear to auscultation bilaterally. No wheezing, rales, or rhonchi. Right lateral chest wall tenderness. Pt with painful deep inspirations.  Abdominal: Soft and non-distended.  There is no tenderness.  No rebound, guarding, or rigidity.  Good bowel sounds.    Musculoskeletal: Moves all extremities. No obvious deformities. No edema. No calf  "tenderness.  Skin: Warm and dry.  Neurological: Patient is alert and oriented to person, place and time. Pupils ERRL and EOM normal. Cranial nerves II-XII are intact. Strength is full bilaterally; it is equal and 5/5 in bilateral upper and lower extremities. There is no pronator drift of outstretched arms. Light touch sense is intact. Speech is clear and normal. No acute focal neurological deficits noted.  Psychiatric: Normal affect. Good eye contact. Appropriate in content.    ED Course    Procedures  ED Vital Signs:  Vitals:    05/07/17 0927 05/07/17 1013 05/07/17 1025 05/07/17 1105   BP: (!) 179/71  (!) 189/61 (!) 167/58   Pulse: 61 (!) 50 (!) 59 60   Resp: 20  14 19   Temp: 97.2 °F (36.2 °C)      TempSrc: Oral      SpO2: 96%  98% 99%   Weight: 69.4 kg (153 lb)      Height: 5' 3.5" (1.613 m)          Abnormal Lab Results:  Labs Reviewed   CBC W/ AUTO DIFFERENTIAL - Abnormal; Notable for the following:        Result Value    MCH 31.2 (*)     All other components within normal limits   URINALYSIS - Abnormal; Notable for the following:     Specific Gravity, UA <=1.005 (*)     Glucose, UA Trace (*)     Leukocytes, UA 1+ (*)     All other components within normal limits   COMPREHENSIVE METABOLIC PANEL - Abnormal; Notable for the following:     Glucose 150 (*)     BUN, Bld 35 (*)     AST 51 (*)     ALT 53 (*)     eGFR if  50 (*)     eGFR if non  43 (*)     All other components within normal limits   APTT   PROTIME-INR   URINALYSIS MICROSCOPIC        All Lab Results:  Results for orders placed or performed during the hospital encounter of 05/07/17   CBC auto differential   Result Value Ref Range    WBC 7.67 3.90 - 12.70 K/uL    RBC 4.17 4.00 - 5.40 M/uL    Hemoglobin 13.0 12.0 - 16.0 g/dL    Hematocrit 38.6 37.0 - 48.5 %    MCV 93 82 - 98 fL    MCH 31.2 (H) 27.0 - 31.0 pg    MCHC 33.7 32.0 - 36.0 %    RDW 13.5 11.5 - 14.5 %    Platelets 229 150 - 350 K/uL    MPV 10.5 9.2 - 12.9 fL    Gran " # 5.2 1.8 - 7.7 K/uL    Lymph # 1.7 1.0 - 4.8 K/uL    Mono # 0.6 0.3 - 1.0 K/uL    Eos # 0.2 0.0 - 0.5 K/uL    Baso # 0.04 0.00 - 0.20 K/uL    Gran% 67.7 38.0 - 73.0 %    Lymph% 22.2 18.0 - 48.0 %    Mono% 7.3 4.0 - 15.0 %    Eosinophil% 2.3 0.0 - 8.0 %    Basophil% 0.5 0.0 - 1.9 %    Differential Method Automated    Urinalysis   Result Value Ref Range    Specimen UA Urine, Clean Catch     Color, UA Yellow Yellow, Straw, Fatemeh    Appearance, UA Clear Clear    pH, UA 6.0 5.0 - 8.0    Specific Gravity, UA <=1.005 (A) 1.005 - 1.030    Protein, UA Negative Negative    Glucose, UA Trace (A) Negative    Ketones, UA Negative Negative    Bilirubin (UA) Negative Negative    Occult Blood UA Negative Negative    Nitrite, UA Negative Negative    Urobilinogen, UA Negative <2.0 EU/dL    Leukocytes, UA 1+ (A) Negative   APTT   Result Value Ref Range    aPTT 25.0 21.0 - 32.0 sec   Protime-INR   Result Value Ref Range    Prothrombin Time 11.2 9.0 - 12.5 sec    INR 1.1 0.8 - 1.2   Comprehensive metabolic panel   Result Value Ref Range    Sodium 142 136 - 145 mmol/L    Potassium 3.9 3.5 - 5.1 mmol/L    Chloride 102 95 - 110 mmol/L    CO2 27 23 - 29 mmol/L    Glucose 150 (H) 70 - 110 mg/dL    BUN, Bld 35 (H) 8 - 23 mg/dL    Creatinine 1.2 0.5 - 1.4 mg/dL    Calcium 10.3 8.7 - 10.5 mg/dL    Total Protein 7.8 6.0 - 8.4 g/dL    Albumin 3.8 3.5 - 5.2 g/dL    Total Bilirubin 0.6 0.1 - 1.0 mg/dL    Alkaline Phosphatase 64 55 - 135 U/L    AST 51 (H) 10 - 40 U/L    ALT 53 (H) 10 - 44 U/L    Anion Gap 13 8 - 16 mmol/L    eGFR if African American 50 (A) >60 mL/min/1.73 m^2    eGFR if non African American 43 (A) >60 mL/min/1.73 m^2   Urinalysis Microscopic   Result Value Ref Range    RBC, UA 0 0 - 4 /hpf    WBC, UA 5 0 - 5 /hpf    Bacteria, UA Rare None-Occ /hpf    Squam Epithel, UA 3 /hpf    Microscopic Comment SEE COMMENT            Imaging Results:  Imaging Results         CT Thorax Without Contrast (Final result) Result time:  05/07/17  11:12:26    Final result by Aditya Marie MD (05/07/17 11:12:26)    Impression:         No acute findings.        All CT scans at this facility use dose modulation, iterative reconstruction and/or weight based dosing when appropriate to reduce radiation dose to as low as reasonably achievable.       Electronically signed by: ADITYA MARIE MD  Date:     05/07/17  Time:    11:12     Narrative:    CT CHEST WITHOUT CONTRAST, 05/07/17 10:27:36    History:    Injury chest.  Pain.  Unspecified     Technique: Standard noncontrast CT of the chest.    Findings:     Lung fields are clear.    No pleural fluid or pneumothorax.    No adenopathy is identified.  There is mild cardiomegaly with a pacemaker position via right-sided approach.  The mediastinum appears hemorrhage.    Chronic spurring and degenerative change of the spine but no fracture is demonstrated.  The bones appear intact.  No fracture is demonstrated.  Chronic spurring and degenerative change of the thoracic spine is present.    Images of the upper abdomen demonstrate no suspicious findings.            CT Head Without Contrast (Final result) Result time:  05/07/17 11:08:52    Final result by Aditya Marie MD (05/07/17 11:08:52)    Impression:     Mild to moderate generalized age-related atrophy. No acute findings.        All CT scans at this facility use dose modulation, iterative reconstruction and/or weight based dosing when appropriate to reduce radiation dose to as low as reasonably achievable.       Electronically signed by: ADITYA MARIE MD  Date:     05/07/17  Time:    11:08     Narrative:    CT HEAD WITHOUT CONTRAST     History:  Unspecified fall, initial encounter.    Technique:  Noncontrast CT of the brain. Comparison is made with previous study of 07/06/2060    Findings:  The ventricles are dilated consistent with generalized atrophy. Associated age-related white matter degeneration is present.     No significant acute findings are noted.             X-Ray Chest PA And Lateral (Final result) Result time:  05/07/17 10:03:28    Final result by Aditya Marie MD (05/07/17 10:03:28)    Impression:         Stable chest x-ray.  No acute findings.      Electronically signed by: ADITYA MARIE MD  Date:     05/07/17  Time:    10:03     Narrative:    Two-view chest x-ray.05/07/17 09:55:02    Clinical indication: Chest pain, unspecified.    Comparison is made with previous study of 07/04/2016.    Heart size is normal.  A pacemaker remains in place.  The lung fields are clear. No acute pulmonary infiltrate.             The EKG was ordered, reviewed, and independently interpreted by the ED provider.  Interpretation time: 1013  Rate: 50 BPM  Rhythm: ventricular paced rhythm  Interpretation: No STEMI.  Old EKG performed (4/20/17) was NSR eTWT.    The Emergency Provider reviewed the vital signs and test results, which are outlined above.    ED Discussion       11:47 AM: Pacemaker interrogated .    11:52 AM: Reassessed pt at this time.  Pt states her condition has improved at this time. Discussed with pt all pertinent ED information and results. Discussed pt dx and plan of tx. Gave pt all f/u and return to the ED instructions. All questions and concerns were addressed at this time. Pt expresses understanding of information and instructions, and is comfortable with plan to discharge. Pt is stable for discharge.      I have discussed with patient and/or family/caretaker chest pain precautions, specifically to return for worsening chest pain, shortness of breath, fever, or any concern.  I have low suspicion for cardiopulmonary, vascular, infectious, respiratory, or other emergent medical condition based on my evaluation in the ED.        Pre-hypertension/Hypertension: The pt has been informed that they may have pre-hypertension or hypertension based on a blood pressure reading in the ED. I recommend that the pt call the PCP listed on their discharge instructions or a  physician of their choice this week to arrange f/u for further evaluation of possible pre-hypertension or hypertension.     ED Medication(s):  Medications   hydromorphone (PF) injection 1 mg (1 mg Intravenous Given 5/7/17 1129)   ondansetron disintegrating tablet 4 mg (4 mg Oral Given 5/7/17 1128)       New Prescriptions    No medications on file              Medical Decision Making    Medical Decision Making:   Clinical Tests:   Lab Tests: Ordered and Reviewed  Radiological Study: Reviewed and Ordered  Medical Tests: Reviewed and Ordered           Scribe Attestation:   Scribe #1: I performed the above scribed service and the documentation accurately describes the services I performed. I attest to the accuracy of the note.    Attending:   Physician Attestation Statement for Scribe #1: I, Allie Downey MD, personally performed the services described in this documentation, as scribed by Yoel Morales in my presence, and it is both accurate and complete.          Clinical Impression       ICD-10-CM ICD-9-CM   1. Pacemaker generator end of life Z45.010 V53.31   2. Pain R52 780.96   3. Fall W19.XXXA E888.9   4. Rib pain on right side R07.81 786.50       Disposition:   Disposition: Discharged  Condition: Stable         Allie Downey MD  05/07/17 0197

## 2017-05-07 NOTE — ED NOTES
[employer] called for patient pacemaker to be interrogated. 9  and spoke to [employer] that will send out a representative shortly

## 2017-05-07 NOTE — ED AVS SNAPSHOT
[facility]  [address] [facility] [address]               Tatiana Chaparro   2017  9:32 AM   ED    Description:  Female : 1940   Department:  [facility]           Your Care was Coordinated By:     Provider Role From To    Allie Downey MD Attending Provider 17 0957 --      Reason for Visit     Chest Pain           Diagnoses this Visit        Comments    Pacemaker generator end of life    -  Primary     Pain         Fall         Rib pain on right side           ED Disposition     None           To Do List           Follow-up Information     Follow up with [facility].    Specialty:  Emergency Medicine    Why:  If symptoms worsen    Contact information:    [address] [place] [id number] [phone]       These Medications        Disp Refills Start End    oxycodone-acetaminophen (PERCOCET) 7.5-325 mg per tablet 6 tablet 0 2017     Take 1 tablet by mouth every 24 hours as needed for Pain. - Oral      [facility]     [facility] Care [facility] - 24 Assistance  Unless otherwise directed by your provider, please contact [facility], our nurse care line that is available for  assistance.     Registered nurses in the [facility] provide: appointment scheduling, clinical advisement, health education, and other advisory services.  Call: [phone] (toll free)               Medications           Message regarding Medications     Verify the changes and/or additions to your medication regime listed below are the same as discussed with your clinician today.  If any of these changes or additions are incorrect, please notify your healthcare provider.        These medications were administered today        Dose Freq    hydromorphone (PF) injection 1 mg 1 mg ED 1 Time    Sig: Inject 0.5 mLs (1 mg total) into the vein ED 1 Time.    Class: Normal     "Route: Intravenous    ondansetron disintegrating tablet 4 mg 4 mg ED 1 Time    Sig: Take 1 tablet (4 mg total) by mouth ED 1 Time.    Class: Normal    Route: Oral      CHANGE how you are taking these medications     Start Taking Instead of    oxycodone-acetaminophen (PERCOCET) 7.5-325 mg per tablet oxycodone-acetaminophen (PERCOCET) 7.5-325 mg per tablet    Dosage:  Take 1 tablet by mouth every 24 hours as needed for Pain. Dosage:  Take 1 tablet by mouth every 4 (four) hours as needed for Pain.           Verify that the below list of medications is an accurate representation of the medications you are currently taking.  If none reported, the list may be blank. If incorrect, please contact your healthcare provider. Carry this list with you in case of emergency.           Current Medications     alprazolam (XANAX) 0.5 MG tablet Take 0.5 mg by mouth every evening.     apixaban 5 mg Tab Take 1 tablet (5 mg total) by mouth 2 (two) times daily.    atenolol (TENORMIN) 25 MG tablet Take 1 tablet (25 mg total) by mouth once daily.    atorvastatin (LIPITOR) 10 MG tablet Take 10 mg by mouth once daily.    gabapentin (NEURONTIN) 100 MG capsule Take 100 mg by mouth 2 (two) times daily. Takes one tablet by mouth in the morning and two tablets at night    hydrochlorothiazide (HYDRODIURIL) 25 MG tablet Take 25 mg by mouth once daily.    metformin (GLUCOPHAGE) 500 MG tablet Take 500 mg by mouth 2 (two) times daily with meals.    nitrofurantoin (MACRODANTIN) 50 MG capsule TAKE ONE CAPSULE BY MOUTH AT BEDTIME THANK YOU    quinapril (ACCUPRIL) 40 MG tablet Take 40 mg by mouth 2 (two) times daily.    oxycodone-acetaminophen (PERCOCET) 7.5-325 mg per tablet Take 1 tablet by mouth every 24 hours as needed for Pain.           Clinical Reference Information           Your Vitals Were     BP Pulse Temp Resp Height Weight    167/58 60 97.2 °F (36.2 °C) (Oral) 19 5' 3.5" (1.613 m) 69.4 kg (153 lb)    SpO2 BMI             99% 26.68 kg/m2       "   Allergies as of 5/7/2017        Reactions    Codeine Itching    Morphine Itching    Iodine And Iodide Containing Products Rash      Immunizations Administered on Date of Encounter - 5/7/2017     None      ED Micro, Lab, POCT     Start Ordered       Status Ordering Provider    05/07/17 1005 05/07/17 1004  CBC auto differential  STAT      Final result     05/07/17 1005 05/07/17 1004  Urinalysis  STAT      Final result     05/07/17 1005 05/07/17 1004  APTT  STAT      Final result     05/07/17 1005 05/07/17 1004  Protime-INR  STAT      Final result     05/07/17 1005 05/07/17 1004  Comprehensive metabolic panel  STAT      Final result     05/07/17 1004 05/07/17 1004  Urinalysis Microscopic  Once      Final result       ED Imaging Orders     Start Ordered       Status Ordering Provider    05/07/17 1004 05/07/17 1004  CT Head Without Contrast  1 time imaging      Final result     05/07/17 1004 05/07/17 1004  CT Thorax Without Contrast  1 time imaging      Final result     05/07/17 0935 05/07/17 0935  X-Ray Chest PA And Lateral  1 time imaging      Final result       Your Scheduled Appointments     May 16, 2017  8:20 AM CDT   Established Patient Visit with [facility] ([facility])    [id number] [place] [id number] [phone]              [facility] Sign-Up     Activating your [facility] account is as easy as 1-2-3!     1) Visit [email or web address], select Sign Up Now, enter this activation code and your date of birth, then select Next.  [email or web address]  Expires: 6/4/2017  2:16 PM      2) Create a username and password to use when you visit [facility] in the future and select a security question in case you lose your password and select Next.    3) Enter your e-mail address and click Sign Up!    Additional Information  If you have questions, please e-mail [email or web address] or call [phone] to talk to our [facility] staff. Remember, [facility] is NOT to be used  for urgent needs. For medical emergencies, dial 911.          [facility] complies with applicable Federal civil rights laws and does not discriminate on the basis of race, color, national origin, age, disability, or sex.        Language Assistance Services     ATTENTION: Language assistance services are available, free of charge. Please call [phone].      ATENCIÓN: Si habla español, tiene a franklin disposición servicios gratuitos de asistencia lingüística. Llame al [phone].     SUNG Ý: N?u b?n nói Ti?ng Vi?t, có các d?ch v? h? tr? ngôn ng? mi?n phí dành cho b?n. G?i s? [phone].

## 2017-05-16 ENCOUNTER — OFFICE VISIT (OUTPATIENT)
Dept: CARDIOLOGY | Facility: CLINIC | Age: 77
End: 2017-05-16
Payer: MEDICARE

## 2017-05-16 VITALS
HEIGHT: 64 IN | DIASTOLIC BLOOD PRESSURE: 60 MMHG | HEART RATE: 60 BPM | BODY MASS INDEX: 25.44 KG/M2 | WEIGHT: 149 LBS | SYSTOLIC BLOOD PRESSURE: 130 MMHG

## 2017-05-16 DIAGNOSIS — Z95.0 CARDIAC PACEMAKER IN SITU: Chronic | ICD-10-CM

## 2017-05-16 DIAGNOSIS — I49.5 SINUS NODE DYSFUNCTION: Chronic | ICD-10-CM

## 2017-05-16 DIAGNOSIS — I10 ESSENTIAL (PRIMARY) HYPERTENSION: Primary | Chronic | ICD-10-CM

## 2017-05-16 DIAGNOSIS — I48.0 AF (PAROXYSMAL ATRIAL FIBRILLATION): Chronic | ICD-10-CM

## 2017-05-16 PROBLEM — Z45.010 PACEMAKER GENERATOR END OF LIFE: Status: RESOLVED | Noted: 2017-04-27 | Resolved: 2017-05-16

## 2017-05-16 PROBLEM — Z45.010 PACEMAKER AT END OF BATTERY LIFE: Status: RESOLVED | Noted: 2017-04-21 | Resolved: 2017-05-16

## 2017-05-16 PROCEDURE — 99999 PR PBB SHADOW E&M-EST. PATIENT-LVL III: CPT | Mod: PBBFAC,,, | Performed by: NUCLEAR MEDICINE

## 2017-05-16 PROCEDURE — 3075F SYST BP GE 130 - 139MM HG: CPT | Mod: S$GLB,,, | Performed by: NUCLEAR MEDICINE

## 2017-05-16 PROCEDURE — 3078F DIAST BP <80 MM HG: CPT | Mod: S$GLB,,, | Performed by: NUCLEAR MEDICINE

## 2017-05-16 PROCEDURE — 99214 OFFICE O/P EST MOD 30 MIN: CPT | Mod: S$GLB,,, | Performed by: NUCLEAR MEDICINE

## 2017-05-16 PROCEDURE — 1160F RVW MEDS BY RX/DR IN RCRD: CPT | Mod: S$GLB,,, | Performed by: NUCLEAR MEDICINE

## 2017-05-16 PROCEDURE — 1159F MED LIST DOCD IN RCRD: CPT | Mod: S$GLB,,, | Performed by: NUCLEAR MEDICINE

## 2017-05-16 PROCEDURE — 1126F AMNT PAIN NOTED NONE PRSNT: CPT | Mod: S$GLB,,, | Performed by: NUCLEAR MEDICINE

## 2017-05-16 RX ORDER — IBUPROFEN 800 MG/1
TABLET ORAL
Refills: 3 | COMMUNITY
Start: 2017-05-06 | End: 2018-03-14 | Stop reason: ALTCHOICE

## 2017-05-16 NOTE — PROGRESS NOTES
Subjective:   Patient ID:  Tatiana Chaparro is a 76 y.o. female who presents for follow-up of Hypertension (2 week followup); Wound Check (s/p pacemaker battery replaced.); sinus node dysfunction (PPM- DDDR.); and Atrial Fibrillation (PAROXYSMAL)      HPI1- ESSENTIAL HTN,  2- SND, PAF, POST PPM-DDDR- RECENT GENERATOR CHANGE  DOING WELL. NO UNUSUAL FEVER OR CHILLS. NO ABNORMAL BLEEDING AT POCKET SITE.NO HEMATOMA . NO EXUDATE   NO PAIN OR TENDERNESS  NO UNUSUAL FATIGUE OR TIREDNESS.  NO PALPITATIONS. NO NEAR SYNCOPE OR SYNCOPE  NO UNUSUAL BROOKE. NO ORTHOPNEA OR PND  NO FOCAL CNS SYMPTOMS OR SIGNS TO SUGGEST TIA OR STROKE  CARD MED- GOOD COMPLIANCE    Review of Systems   Constitution: Negative for chills, fever, weakness, night sweats, weight gain and weight loss.   HENT: Negative for headaches and nosebleeds.    Eyes: Negative for blurred vision, double vision and visual disturbance.   Cardiovascular: Negative for chest pain, dyspnea on exertion, irregular heartbeat, leg swelling, orthopnea, palpitations, paroxysmal nocturnal dyspnea and syncope.   Respiratory: Negative for cough, hemoptysis and wheezing.    Endocrine: Negative for polydipsia and polyuria.   Hematologic/Lymphatic: Does not bruise/bleed easily.   Skin: Negative for rash.   Musculoskeletal: Negative for joint pain, joint swelling, muscle weakness and myalgias.   Gastrointestinal: Negative for abdominal pain, hematemesis, jaundice and melena.   Genitourinary: Negative for dysuria, hematuria and nocturia.   Neurological: Negative for dizziness, focal weakness and sensory change.   Psychiatric/Behavioral: Negative for depression. The patient does not have insomnia and is not nervous/anxious.      History reviewed. No pertinent family history.  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       apixaban 5 mg Tab Take 1 tablet (5 mg total) by mouth 2 (two) times daily. 60 tablet 3    atenolol (TENORMIN) 25 MG tablet Take 1 tablet (25 mg total) by mouth once daily. 30 tablet 1    atorvastatin (LIPITOR) 10 MG tablet Take 10 mg by mouth once daily.      gabapentin (NEURONTIN) 100 MG capsule Take 100 mg by mouth 2 (two) times daily. Takes one tablet by mouth in the morning and two tablets at night      hydrochlorothiazide (HYDRODIURIL) 25 MG tablet Take 25 mg by mouth once daily.      metformin (GLUCOPHAGE) 500 MG tablet Take 500 mg by mouth 2 (two) times daily with meals.      nitrofurantoin (MACRODANTIN) 50 MG capsule TAKE ONE CAPSULE BY MOUTH AT BEDTIME THANK YOU  5    quinapril (ACCUPRIL) 40 MG tablet Take 40 mg by mouth 2 (two) times daily.      [DISCONTINUED] oxycodone-acetaminophen (PERCOCET) 7.5-325 mg per tablet Take 1 tablet by mouth every 24 hours as needed for Pain. 6 tablet 0     No current facility-administered medications on file prior to visit.      Review of patient's allergies indicates:   Allergen Reactions    Iodine and iodide containing products Anaphylaxis and Rash    Codeine Itching    Morphine Itching       Objective:     Physical Exam   Constitutional: She is oriented to person, place, and time. She appears well-developed. No distress.   HENT:   Head: Normocephalic.   Eyes: Conjunctivae are normal. Pupils are equal, round, and reactive to light. No scleral icterus.   Neck: Normal range of motion. Neck supple. Normal carotid pulses, no  hepatojugular reflux and no JVD present. Carotid bruit is not present. No edema present. No thyroid mass and no thyromegaly present.   Cardiovascular: Normal rate, regular rhythm, S1 normal, S2 normal, normal heart sounds and intact distal pulses.  PMI is not displaced.  Exam reveals no gallop and no friction rub.    No murmur heard.  Pulses:       Carotid pulses are 2+ on the right side, and 2+ on the left side.       Radial pulses are 2+ on the right side, and 2+ on the left side.        Femoral pulses are 2+ on the right side, and 2+ on the left side.       Popliteal pulses are 2+ on the right side, and 2+ on the left side.        Dorsalis pedis pulses are 2+ on the right side, and 2+ on the left side.        Posterior tibial pulses are 2+ on the right side, and 2+ on the left side.   Pulmonary/Chest: Effort normal and breath sounds normal. She has no wheezes. She has no rales. She exhibits no tenderness.   Abdominal: Soft. Bowel sounds are normal. She exhibits no pulsatile midline mass and no mass. There is no hepatosplenomegaly. There is no tenderness.   Musculoskeletal: Normal range of motion. She exhibits no edema or tenderness.        Cervical back: Normal.        Thoracic back: Normal.        Lumbar back: Normal.   Lymphadenopathy:     She has no cervical adenopathy.     She has no axillary adenopathy.        Right: No supraclavicular adenopathy present.        Left: No supraclavicular adenopathy present.   Neurological: She is alert and oriented to person, place, and time. She has normal strength and normal reflexes. No sensory deficit. Gait normal.   Skin: Skin is warm. No rash noted. No cyanosis. No pallor. Nails show no clubbing.   Psychiatric: She has a normal mood and affect. Her speech is normal and behavior is normal. Cognition and memory are normal.       Assessment:     1. Essential (primary) hypertension    2. AF (paroxysmal atrial fibrillation)    3. Sinus node dysfunction    4. Cardiac  pacemaker in situ      STABLE CV STATUS  PACE POCKET OK  CNS STATUS STABLE  NO EVIDENCE OF CARD ARRHYTHMIAS. NO ADHF. NO ACTIVE MYOCARDIAL ISCHEMIA  CARD MED WELL TOLERATED  Plan:     Essential (primary) hypertension    AF (paroxysmal atrial fibrillation)    Sinus node dysfunction    Cardiac pacemaker in situ    CONTINUE PRESENT CARD MANAGEMENT    RETURN IN 6 MONTHS.

## 2017-05-16 NOTE — MR AVS SNAPSHOT
[facility]  [address] [facility]  [place] [address]  Phone: [phone]  Fax: [phone]                  Tatiana Chaparro   2017 8:20 AM   Office Visit    Description:  Female : 1940   Provider:  Robert Mittal MD   Department:  [facility]           Reason for Visit     Hypertension     Wound Check     sinus node dysfunction     Atrial Fibrillation           Diagnoses this Visit        Comments    Essential (primary) hypertension    -  Primary     AF (paroxysmal atrial fibrillation)         Sinus node dysfunction         Cardiac pacemaker in situ                To Do List           Goals (5 Years of Data)     None      Follow-Up and Disposition     Return in about 6 months (around 2017).      [facility]     [facility] Nurse Care Line -  Assistance  Unless otherwise directed by your provider, please contact [facility], our nurse care line that is available for  assistance.     Registered nurses in the [facility] Center provide: appointment scheduling, clinical advisement, health education, and other advisory services.  Call: [phone] (toll free)               Medications           Message regarding Medications     Verify the changes and/or additions to your medication regime listed below are the same as discussed with your clinician today.  If any of these changes or additions are incorrect, please notify your healthcare provider.        STOP taking these medications     oxycodone-acetaminophen (PERCOCET) 7.5-325 mg per tablet Take 1 tablet by mouth every 24 hours as needed for Pain.           Verify that the below list of medications is an accurate representation of the medications you are currently taking.  If none reported, the list may be blank. If incorrect, please contact your healthcare provider. Carry this list with you in case of emergency.           Current Medications     alprazolam (XANAX) 0.5 MG tablet  "Take 0.5 mg by mouth every evening.     apixaban 5 mg Tab Take 1 tablet (5 mg total) by mouth 2 (two) times daily.    atenolol (TENORMIN) 25 MG tablet Take 1 tablet (25 mg total) by mouth once daily.    atorvastatin (LIPITOR) 10 MG tablet Take 10 mg by mouth once daily.    gabapentin (NEURONTIN) 100 MG capsule Take 100 mg by mouth 2 (two) times daily. Takes one tablet by mouth in the morning and two tablets at night    hydrochlorothiazide (HYDRODIURIL) 25 MG tablet Take 25 mg by mouth once daily.    ibuprofen (ADVIL,MOTRIN) 800 MG tablet TAKE ONE TABLET BY MOUTH THREE TIMES DAILY WITH FOOD THANK YOU    metformin (GLUCOPHAGE) 500 MG tablet Take 500 mg by mouth 2 (two) times daily with meals.    nitrofurantoin (MACRODANTIN) 50 MG capsule TAKE ONE CAPSULE BY MOUTH AT BEDTIME THANK YOU    quinapril (ACCUPRIL) 40 MG tablet Take 40 mg by mouth 2 (two) times daily.           Clinical Reference Information           Your Vitals Were     BP Pulse Height Weight BMI    130/60 (BP Location: Left arm, Patient Position: Sitting, BP Method: Manual) 60 5' 3.5" (1.613 m) 67.6 kg (149 lb) 25.98 kg/m2      Blood Pressure          Most Recent Value    BP  130/60      Allergies as of 5/16/2017     Iodine And Iodide Containing Products    Codeine    Morphine      Immunizations Administered on Date of Encounter - 5/16/2017     None      [facility] Sign-Up     Activating your [facility] account is as easy as 1-2-3!     1) Visit [email or web address], select Sign Up Now, enter this activation code and your date of birth, then select Next.  [email or web address]  Expires: 6/4/2017  2:16 PM      2) Create a username and password to use when you visit [facility] in the future and select a security question in case you lose your password and select Next.    3) Enter your e-mail address and click Sign Up!    Additional Information  If you have questions, please e-mail [email or web address] or call [phone] to talk to our [facility] staff. Remember, " [facility] is NOT to be used for urgent needs. For medical emergencies, dial 911.         Language Assistance Services     ATTENTION: Language assistance services are available, free of charge. Please call [phone].      ATENCIÓN: Si habla kelvin, tiene a franklin disposición servicios gratuitos de asistencia lingüística. Llame al [phone].     CHÚ Ý: N?u b?n nói Ti?ng Vi?t, có các d?ch v? h? tr? ngôn ng? mi?n phí dành cho b?n. G?i s? [phone].         [facility] complies with applicable Federal civil rights laws and does not discriminate on the basis of race, color, national origin, age, disability, or sex.

## 2017-07-18 DIAGNOSIS — I49.5 SINUS NODE DYSFUNCTION: ICD-10-CM

## 2017-07-18 DIAGNOSIS — Z95.0 CARDIAC PACEMAKER IN SITU: Primary | ICD-10-CM

## 2017-10-10 ENCOUNTER — HOSPITAL ENCOUNTER (EMERGENCY)
Facility: HOSPITAL | Age: 77
Discharge: HOME | End: 2017-10-10
Attending: EMERGENCY MEDICINE
Payer: MEDICARE

## 2017-10-10 VITALS
TEMPERATURE: 98 F | RESPIRATION RATE: 16 BRPM | WEIGHT: 150 LBS | BODY MASS INDEX: 26.58 KG/M2 | DIASTOLIC BLOOD PRESSURE: 90 MMHG | HEART RATE: 62 BPM | OXYGEN SATURATION: 99 % | SYSTOLIC BLOOD PRESSURE: 180 MMHG | HEIGHT: 63 IN

## 2017-10-10 DIAGNOSIS — M25.551 BILATERAL HIP PAIN: Primary | ICD-10-CM

## 2017-10-10 DIAGNOSIS — M25.552 BILATERAL HIP PAIN: Primary | ICD-10-CM

## 2017-10-10 DIAGNOSIS — M25.511 ACUTE PAIN OF RIGHT SHOULDER: ICD-10-CM

## 2017-10-10 PROCEDURE — 99283 EMERGENCY DEPT VISIT LOW MDM: CPT | Mod: 25

## 2017-10-10 PROCEDURE — 63600175 PHARM REV CODE 636 W HCPCS: Performed by: NURSE PRACTITIONER

## 2017-10-10 PROCEDURE — 96372 THER/PROPH/DIAG INJ SC/IM: CPT

## 2017-10-10 PROCEDURE — 25000003 PHARM REV CODE 250: Performed by: NURSE PRACTITIONER

## 2017-10-10 RX ORDER — HYDROCODONE BITARTRATE AND ACETAMINOPHEN 5; 325 MG/1; MG/1
1 TABLET ORAL EVERY 4 HOURS PRN
Qty: 16 TABLET | Refills: 0 | Status: SHIPPED | OUTPATIENT
Start: 2017-10-10 | End: 2018-01-03

## 2017-10-10 RX ORDER — HYDROCODONE BITARTRATE AND ACETAMINOPHEN 5; 325 MG/1; MG/1
1 TABLET ORAL
Status: COMPLETED | OUTPATIENT
Start: 2017-10-10 | End: 2017-10-10

## 2017-10-10 RX ORDER — ONDANSETRON 8 MG/1
8 TABLET, ORALLY DISINTEGRATING ORAL
Status: COMPLETED | OUTPATIENT
Start: 2017-10-10 | End: 2017-10-10

## 2017-10-10 RX ORDER — DEXAMETHASONE SODIUM PHOSPHATE 4 MG/ML
8 INJECTION, SOLUTION INTRA-ARTICULAR; INTRALESIONAL; INTRAMUSCULAR; INTRAVENOUS; SOFT TISSUE
Status: COMPLETED | OUTPATIENT
Start: 2017-10-10 | End: 2017-10-10

## 2017-10-10 RX ADMIN — DEXAMETHASONE SODIUM PHOSPHATE 8 MG: 4 INJECTION, SOLUTION INTRAMUSCULAR; INTRAVENOUS at 10:10

## 2017-10-10 RX ADMIN — HYDROCODONE BITARTRATE AND ACETAMINOPHEN 1 TABLET: 5; 325 TABLET ORAL at 10:10

## 2017-10-10 RX ADMIN — ONDANSETRON 8 MG: 8 TABLET, ORALLY DISINTEGRATING ORAL at 10:10

## 2017-10-10 NOTE — ED NOTES
Patient identifiers verified and correct for Tatiana [facility].    LOC: The patient is awake, alert and aware of environment with an appropriate affect, the patient is oriented x 3 and speaking appropriately.  APPEARANCE: Patient resting comfortably and in no acute distress, patient is clean and well groomed, patient's clothing is properly fastened.  SKIN: The skin is warm and dry, color consistent with ethnicity, patient has normal skin turgor and moist mucus membranes, skin intact, no breakdown or bruising noted.  MUSCULOSKELETAL: Patient moving all extremities spontaneously. Pt c/o bilateral leg pain.  RESPIRATORY: Airway is open and patent, respirations are spontaneous.  CARDIAC: Patient has a normal rate, no peripheral edema noted, capillary refill < 3 seconds.  ABDOMEN: Soft and non tender to palpation.

## 2017-10-10 NOTE — ED PROVIDER NOTES
Encounter Date: 10/10/2017       History     Chief Complaint   Patient presents with    Leg Pain     pt states her legs are aching      76 year old female with complaint of bilateral lower leg pain and right shoulder pain X several weeks.  Reports pain worse with movement of right shoulder and pain worse with walking.  No fever or chills.  No weight loss.  No recent fall.            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 No pertinent family history.  Social History   Substance Use Topics    Smoking status: Never Smoker    Smokeless tobacco: Not on file    Alcohol use No     Review of Systems   Constitutional: Negative for fever.   HENT: Negative for sore throat.    Respiratory: Negative for shortness of breath.    Cardiovascular: Negative for chest pain.   Gastrointestinal: Negative for nausea.   Genitourinary: Negative for dysuria.   Musculoskeletal: Negative for back pain.        Bilateral hip pain, right shoulder pain    Skin: Negative for rash.   Neurological: Negative for weakness.   Hematological: Does not bruise/bleed easily.       Physical Exam     Initial Vitals [10/10/17 0933]   BP Pulse Resp Temp SpO2   (!) 180/90 62 16 97.9  °F (36.6 °C) 99 %      MAP       120         Physical Exam    Nursing note and vitals reviewed.  Constitutional: She appears well-developed and well-nourished.   HENT:   Head: Normocephalic and atraumatic.   Eyes: Conjunctivae and EOM are normal. Pupils are equal, round, and reactive to light.   Neck: Normal range of motion. Neck supple.   Cardiovascular: Normal rate, regular rhythm, normal heart sounds and intact distal pulses.   Pulmonary/Chest: Breath sounds normal.   Abdominal: Soft. There is no tenderness. There is no rebound and no guarding.   Musculoskeletal: Normal range of motion.   Right anterior shoulder tenderness, pain with ROM of right shoulder, no spine tenderness, ambulates without difficulty, no hip tenderness   Neurological: She is alert and oriented to person, place, and time. She has normal strength and normal reflexes.   Skin: Skin is warm and dry.   Psychiatric: She has a normal mood and affect. Her behavior is normal. Thought content normal.         ED Course   Procedures  Labs Reviewed - No data to display                            ED Course      Clinical Impression:   The primary encounter diagnosis was Bilateral hip pain. A diagnosis of Acute pain of right shoulder was also pertinent to this visit.                           Keenan Matthews NP  10/10/17 0954       Keenan Matthews NP  10/10/17 0954

## 2017-10-18 ENCOUNTER — CLINICAL SUPPORT (OUTPATIENT)
Dept: CARDIOLOGY | Facility: CLINIC | Age: 77
End: 2017-10-18
Payer: MEDICARE

## 2017-10-18 DIAGNOSIS — Z95.0 CARDIAC PACEMAKER IN SITU: ICD-10-CM

## 2017-10-18 DIAGNOSIS — I49.5 SINUS NODE DYSFUNCTION: ICD-10-CM

## 2017-10-18 PROCEDURE — 93280 PM DEVICE PROGR EVAL DUAL: CPT | Mod: S$GLB,,, | Performed by: INTERNAL MEDICINE

## 2017-10-20 ENCOUNTER — HOSPITAL ENCOUNTER (EMERGENCY)
Facility: HOSPITAL | Age: 77
Discharge: HOME | End: 2017-10-20
Attending: EMERGENCY MEDICINE
Payer: MEDICARE

## 2017-10-20 VITALS
WEIGHT: 148 LBS | DIASTOLIC BLOOD PRESSURE: 72 MMHG | HEART RATE: 71 BPM | BODY MASS INDEX: 25.27 KG/M2 | TEMPERATURE: 99 F | RESPIRATION RATE: 18 BRPM | SYSTOLIC BLOOD PRESSURE: 135 MMHG | OXYGEN SATURATION: 100 % | HEIGHT: 64 IN

## 2017-10-20 DIAGNOSIS — M79.10 MYALGIA: Primary | ICD-10-CM

## 2017-10-20 LAB
ALBUMIN SERPL BCP-MCNC: 3.4 G/DL
ALP SERPL-CCNC: 70 U/L
ALT SERPL W/O P-5'-P-CCNC: 22 U/L
ANION GAP SERPL CALC-SCNC: 11 MMOL/L
AST SERPL-CCNC: 17 U/L
BACTERIA #/AREA URNS HPF: NORMAL /HPF
BASOPHILS # BLD AUTO: 0.04 K/UL
BASOPHILS NFR BLD: 0.4 %
BILIRUB SERPL-MCNC: 0.5 MG/DL
BILIRUB UR QL STRIP: NEGATIVE
BUN SERPL-MCNC: 25 MG/DL
CALCIUM SERPL-MCNC: 10.3 MG/DL
CHLORIDE SERPL-SCNC: 102 MMOL/L
CK SERPL-CCNC: 30 U/L
CLARITY UR: CLEAR
CO2 SERPL-SCNC: 28 MMOL/L
COLOR UR: YELLOW
CREAT SERPL-MCNC: 1.2 MG/DL
DIFFERENTIAL METHOD: ABNORMAL
EOSINOPHIL # BLD AUTO: 0.1 K/UL
EOSINOPHIL NFR BLD: 0.6 %
ERYTHROCYTE [DISTWIDTH] IN BLOOD BY AUTOMATED COUNT: 13.5 %
EST. GFR  (AFRICAN AMERICAN): 50 ML/MIN/1.73 M^2
EST. GFR  (NON AFRICAN AMERICAN): 43 ML/MIN/1.73 M^2
FLUAV AG SPEC QL IA: NEGATIVE
FLUBV AG SPEC QL IA: NEGATIVE
GLUCOSE SERPL-MCNC: 164 MG/DL
GLUCOSE UR QL STRIP: NEGATIVE
HCT VFR BLD AUTO: 37.6 %
HGB BLD-MCNC: 12.5 G/DL
HGB UR QL STRIP: NEGATIVE
KETONES UR QL STRIP: NEGATIVE
LEUKOCYTE ESTERASE UR QL STRIP: ABNORMAL
LYMPHOCYTES # BLD AUTO: 2 K/UL
LYMPHOCYTES NFR BLD: 17.6 %
MCH RBC QN AUTO: 30.7 PG
MCHC RBC AUTO-ENTMCNC: 33.2 G/DL
MCV RBC AUTO: 92 FL
MICROSCOPIC COMMENT: NORMAL
MONOCYTES # BLD AUTO: 1.1 K/UL
MONOCYTES NFR BLD: 9.7 %
NEUTROPHILS # BLD AUTO: 8 K/UL
NEUTROPHILS NFR BLD: 71.7 %
NITRITE UR QL STRIP: NEGATIVE
PH UR STRIP: 8 [PH] (ref 5–8)
PLATELET # BLD AUTO: 290 K/UL
PMV BLD AUTO: 10.7 FL
POTASSIUM SERPL-SCNC: 4 MMOL/L
PROT SERPL-MCNC: 7.6 G/DL
PROT UR QL STRIP: NEGATIVE
RBC # BLD AUTO: 4.07 M/UL
RBC #/AREA URNS HPF: 1 /HPF (ref 0–4)
SODIUM SERPL-SCNC: 141 MMOL/L
SP GR UR STRIP: 1.01 (ref 1–1.03)
SPECIMEN SOURCE: NORMAL
SQUAMOUS #/AREA URNS HPF: 5 /HPF
URN SPEC COLLECT METH UR: ABNORMAL
UROBILINOGEN UR STRIP-ACNC: NEGATIVE EU/DL
WBC # BLD AUTO: 11.12 K/UL
WBC #/AREA URNS HPF: 2 /HPF (ref 0–5)

## 2017-10-20 PROCEDURE — 99283 EMERGENCY DEPT VISIT LOW MDM: CPT

## 2017-10-20 PROCEDURE — 87400 INFLUENZA A/B EACH AG IA: CPT

## 2017-10-20 PROCEDURE — 81000 URINALYSIS NONAUTO W/SCOPE: CPT

## 2017-10-20 PROCEDURE — 85025 COMPLETE CBC W/AUTO DIFF WBC: CPT

## 2017-10-20 PROCEDURE — 82550 ASSAY OF CK (CPK): CPT

## 2017-10-20 PROCEDURE — 80053 COMPREHEN METABOLIC PANEL: CPT

## 2017-10-20 RX ORDER — HYDROCODONE BITARTRATE AND ACETAMINOPHEN 5; 325 MG/1; MG/1
1 TABLET ORAL EVERY 4 HOURS PRN
Qty: 11 TABLET | Refills: 0 | Status: SHIPPED | OUTPATIENT
Start: 2017-10-20 | End: 2017-10-30

## 2017-10-20 NOTE — ED PROVIDER NOTES
"SCRIBE #1 NOTE: I, Frederick Jane, am scribing for, and in the presence of, Luis A Rich MD. I have scribed the entire note.      History      Chief Complaint   Patient presents with    Muscle Pain     PT states, "I am hurting in both arms and the back of both legs, it hurts to try to stand up and walk, they are just aching."       Review of patient's allergies indicates:   Allergen Reactions    Iodine and iodide containing products Anaphylaxis and Rash    Codeine Itching    Morphine Itching        HPI   HPI    10/20/2017, 11:17 AM   History obtained from the patient      History of Present Illness: Tatiana Chaparro is a 76 y.o. female patient who presents to the Emergency Department for an evaluation of muscle pain in her x4 extremities which onset gradually a few days ago. Symptoms are constant and moderate in severity. Exacerbated by walking and relieved by nothing. Patient denies any fever, chills, trauma, weakness/numbness, back pain, neck pain, and all other sxs at this time. No further complaints or concerns at this time.     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    Carotid artery syndrome 7/5/2016    Chronic back pain     Essential (primary) hypertension     Hyperlipemia 7/18/2016    Hypertension     Lacunar stroke of right subthalamic region     OSVALDO (obstructive sleep apnea) 7/18/2016    Paroxysmal atrial fibrillation 7/4/2016    Transient cerebral ischemia 7/4/2016    Type 2 diabetes mellitus without complication        Past Surgical History:  Past Surgical History:   Procedure Laterality Date    APPENDECTOMY      CARDIAC PACEMAKER PLACEMENT      CHOLECYSTECTOMY      HYSTERECTOMY           Family History:  Family history reviewed not relevant      Social History:  Social History     Social History Main Topics    " Smoking status: Never Smoker    Smokeless tobacco: Unknown    Alcohol use No    Drug use: No    Sexual activity: Unknown       ROS   Review of Systems   Constitutional: Negative for chills and fever.   HENT: Negative for congestion and sore throat.    Respiratory: Negative for cough and shortness of breath.    Cardiovascular: Negative for chest pain.   Gastrointestinal: Negative for abdominal pain, nausea and vomiting.   Genitourinary: Negative for dysuria and hematuria.   Musculoskeletal: Positive for myalgias (Bilateral upper and lower extremities). Negative for back pain and neck pain.        (-) Trauma   Skin: Negative for rash.   Neurological: Negative for weakness, light-headedness, numbness and headaches.   Hematological: Does not bruise/bleed easily.     Physical Exam      Initial Vitals [10/20/17 1041]   BP Pulse Resp Temp SpO2   (!) 138/91 61 20 98.2 °F (36.8 °C) 98 %      MAP       106.67          Physical Exam  Nursing Notes and Vital Signs Reviewed.  Constitutional: Patient is in no acute distress. Well-developed and well-nourished.  Head: Atraumatic. Normocephalic.  Eyes: PERRL. EOM intact. Conjunctivae are not pale. No scleral icterus.  ENT: Mucous membranes are moist. Oropharynx is clear and symmetric.    Neck: Supple. Full ROM. No lymphadenopathy.  Cardiovascular: Regular rate. Regular rhythm. No murmurs, rubs, or gallops. Distal pulses are 2+ and symmetric.  Pulmonary/Chest: No respiratory distress. Clear to auscultation bilaterally. No wheezing, rales, or rhonchi.  Abdominal: Soft and non-distended.  There is no tenderness.  No rebound, guarding, or rigidity. Good bowel sounds.  Genitourinary: No CVA tenderness  Musculoskeletal: Moves all extremities. No obvious deformities. No edema. No calf tenderness.  Skin: Warm and dry.  Neurological:  Alert, awake, and appropriate.  Normal speech.  No acute focal neurological deficits are appreciated.  Psychiatric: Normal affect. Good eye contact.  "Appropriate in content.    ED Course    Procedures  ED Vital Signs:  Vitals:    10/20/17 1041 10/20/17 1242   BP: (!) 138/91 135/72   Pulse: 61 71   Resp: 20 18   Temp: 98.2 °F (36.8 °C) 98.6 °F (37 °C)   TempSrc: Oral    SpO2: 98% 100%   Weight: 67.1 kg (148 lb)    Height: 5' 3.5" (1.613 m)        Abnormal Lab Results:  Labs Reviewed   CBC W/ AUTO DIFFERENTIAL - Abnormal; Notable for the following:        Result Value    Gran # 8.0 (*)     Mono # 1.1 (*)     Lymph% 17.6 (*)     All other components within normal limits   COMPREHENSIVE METABOLIC PANEL - Abnormal; Notable for the following:     Glucose 164 (*)     BUN, Bld 25 (*)     Albumin 3.4 (*)     eGFR if  50 (*)     eGFR if non  43 (*)     All other components within normal limits   URINALYSIS - Abnormal; Notable for the following:     Leukocytes, UA 1+ (*)     All other components within normal limits   CK   URINALYSIS MICROSCOPIC   INFLUENZA A AND B ANTIGEN        All Lab Results:  Results for orders placed or performed during the hospital encounter of 10/20/17   CBC auto differential   Result Value Ref Range    WBC 11.12 3.90 - 12.70 K/uL    RBC 4.07 4.00 - 5.40 M/uL    Hemoglobin 12.5 12.0 - 16.0 g/dL    Hematocrit 37.6 37.0 - 48.5 %    MCV 92 82 - 98 fL    MCH 30.7 27.0 - 31.0 pg    MCHC 33.2 32.0 - 36.0 g/dL    RDW 13.5 11.5 - 14.5 %    Platelets 290 150 - 350 K/uL    MPV 10.7 9.2 - 12.9 fL    Gran # 8.0 (H) 1.8 - 7.7 K/uL    Lymph # 2.0 1.0 - 4.8 K/uL    Mono # 1.1 (H) 0.3 - 1.0 K/uL    Eos # 0.1 0.0 - 0.5 K/uL    Baso # 0.04 0.00 - 0.20 K/uL    Gran% 71.7 38.0 - 73.0 %    Lymph% 17.6 (L) 18.0 - 48.0 %    Mono% 9.7 4.0 - 15.0 %    Eosinophil% 0.6 0.0 - 8.0 %    Basophil% 0.4 0.0 - 1.9 %    Differential Method Automated    Comprehensive metabolic panel   Result Value Ref Range    Sodium 141 136 - 145 mmol/L    Potassium 4.0 3.5 - 5.1 mmol/L    Chloride 102 95 - 110 mmol/L    CO2 28 23 - 29 mmol/L    Glucose 164 (H) 70 - " 110 mg/dL    BUN, Bld 25 (H) 8 - 23 mg/dL    Creatinine 1.2 0.5 - 1.4 mg/dL    Calcium 10.3 8.7 - 10.5 mg/dL    Total Protein 7.6 6.0 - 8.4 g/dL    Albumin 3.4 (L) 3.5 - 5.2 g/dL    Total Bilirubin 0.5 0.1 - 1.0 mg/dL    Alkaline Phosphatase 70 55 - 135 U/L    AST 17 10 - 40 U/L    ALT 22 10 - 44 U/L    Anion Gap 11 8 - 16 mmol/L    eGFR if African American 50 (A) >60 mL/min/1.73 m^2    eGFR if non African American 43 (A) >60 mL/min/1.73 m^2   Urinalysis   Result Value Ref Range    Specimen UA Urine, Clean Catch     Color, UA Yellow Yellow, Straw, Fatemeh    Appearance, UA Clear Clear    pH, UA 8.0 5.0 - 8.0    Specific Gravity, UA 1.010 1.005 - 1.030    Protein, UA Negative Negative    Glucose, UA Negative Negative    Ketones, UA Negative Negative    Bilirubin (UA) Negative Negative    Occult Blood UA Negative Negative    Nitrite, UA Negative Negative    Urobilinogen, UA Negative <2.0 EU/dL    Leukocytes, UA 1+ (A) Negative   CK   Result Value Ref Range    CPK 30 20 - 180 U/L   Urinalysis Microscopic   Result Value Ref Range    RBC, UA 1 0 - 4 /hpf    WBC, UA 2 0 - 5 /hpf    Bacteria, UA Rare None-Occ /hpf    Squam Epithel, UA 5 /hpf    Microscopic Comment SEE COMMENT    Influenza antigen Nasopharyngeal Swab   Result Value Ref Range    Influenza A Ag, EIA Negative Negative    Influenza B Ag, EIA Negative Negative    Flu A & B Source Nasopharyngeal Swab             The Emergency Provider reviewed the vital signs and test results, which are outlined above.    ED Discussion     12:41 PM: Reassessed pt at this time. Pt is awake, alert, and in NAD. Discussed with pt all pertinent ED information and results. Discussed pt dx of myalgia and plan of tx. Gave pt all f/u and return to the ED instructions. All questions and concerns were addressed at this time. Pt expresses understanding of information and instructions, and is comfortable with plan to discharge. Pt is stable for discharge.    I discussed with patient and/or  family/caretaker that evaluation in the ED does not suggest any emergent or life threatening medical conditions requiring immediate intervention beyond what was provided in the ED, and I believe patient is safe for discharge.  Regardless, an unremarkable evaluation in the ED does not preclude the development or presence of a serious of life threatening condition. As such, patient was instructed to return immediately for any worsening or change in current symptoms.      ED Medication(s):  Medications - No data to display    Discharge Medication List as of 10/20/2017 12:36 PM      START taking these medications    Details   !! hydrocodone-acetaminophen 5-325mg (NORCO) 5-325 mg per tablet Take 1 tablet by mouth every 4 (four) hours as needed for Pain., Starting Fri 10/20/2017, Until Mon 10/30/2017, Print       !! - Potential duplicate medications found. Please discuss with provider.          Follow-up Information     Kendrick Buckner MD In 3 days.    Specialty:  Internal Medicine  Contact information:  [facility] [facility] [address] [phone]                     Medical Decision Making    Medical Decision Making:   Clinical Tests:   Lab Tests: Ordered and Reviewed           Scribe Attestation:   Scribe #1: I performed the above scribed service and the documentation accurately describes the services I performed. I attest to the accuracy of the note.    Attending:   Physician Attestation Statement for Scribe #1: I, Luis A Rich MD, personally performed the services described in this documentation, as scribed by Frederick Jane, in my presence, and it is both accurate and complete.          Clinical Impression       ICD-10-CM ICD-9-CM   1. Myalgia M79.1 729.1       Disposition:   Disposition: Discharged  Condition: Stable         Luis A Rich MD  10/20/17 8298

## 2018-01-03 ENCOUNTER — OFFICE VISIT (OUTPATIENT)
Dept: URGENT CARE | Facility: CLINIC | Age: 78
End: 2018-01-03
Payer: MEDICARE

## 2018-01-03 VITALS
HEART RATE: 70 BPM | WEIGHT: 137.38 LBS | BODY MASS INDEX: 23.96 KG/M2 | OXYGEN SATURATION: 97 % | DIASTOLIC BLOOD PRESSURE: 62 MMHG | SYSTOLIC BLOOD PRESSURE: 126 MMHG | RESPIRATION RATE: 20 BRPM | TEMPERATURE: 97 F

## 2018-01-03 DIAGNOSIS — G89.29 OTHER CHRONIC PAIN: ICD-10-CM

## 2018-01-03 DIAGNOSIS — M79.10 MYALGIA: Primary | ICD-10-CM

## 2018-01-03 PROCEDURE — 99999 PR PBB SHADOW E&M-EST. PATIENT-LVL IV: CPT | Mod: PBBFAC,,, | Performed by: NURSE PRACTITIONER

## 2018-01-03 PROCEDURE — 99214 OFFICE O/P EST MOD 30 MIN: CPT | Mod: S$GLB,,, | Performed by: NURSE PRACTITIONER

## 2018-01-03 RX ORDER — TRAMADOL HYDROCHLORIDE 50 MG/1
50 TABLET ORAL EVERY 12 HOURS PRN
Qty: 14 TABLET | Refills: 0 | Status: SHIPPED | OUTPATIENT
Start: 2018-01-03 | End: 2018-01-10

## 2018-01-03 RX ORDER — ATENOLOL 50 MG/1
TABLET ORAL
Refills: 4 | COMMUNITY
Start: 2017-12-06

## 2018-01-03 RX ORDER — DIPHENOXYLATE HYDROCHLORIDE AND ATROPINE SULFATE 2.5; .025 MG/1; MG/1
TABLET ORAL
Refills: 0 | COMMUNITY
Start: 2017-10-26

## 2018-01-03 RX ORDER — METFORMIN HYDROCHLORIDE 500 MG/1
TABLET, EXTENDED RELEASE ORAL
Refills: 2 | COMMUNITY
Start: 2017-12-06 | End: 2019-03-25 | Stop reason: ALTCHOICE

## 2018-01-03 NOTE — PROGRESS NOTES
Subjective:       Patient ID: Tatiana Chaparro is a 77 y.o. female.    Chief Complaint: Arm Pain    Patient here for chronic/generalized pain. She can no longer see her pain management md due to insurance changes      Review of Systems   Constitutional: Negative for diaphoresis, fatigue and fever.   Respiratory: Negative for cough.    Cardiovascular: Negative.    Musculoskeletal: Positive for arthralgias and gait problem.       Objective:      Physical Exam   Constitutional: She is oriented to person, place, and time. She appears well-developed and well-nourished.  Non-toxic appearance. She does not have a sickly appearance. She does not appear ill. No distress.   Neurological: She is alert and oriented to person, place, and time.   Skin: Skin is warm and dry. She is not diaphoretic.       Assessment:       1. Myalgia    2. Other chronic pain        Plan:   Tatiana was seen today for arm pain.    Diagnoses and all orders for this visit:    Myalgia  -     traMADol (ULTRAM) 50 mg tablet; Take 1 tablet (50 mg total) by mouth every 12 (twelve) hours as needed for Pain. Patient denies adverse reaction with this medication     Other chronic pain  -     Ambulatory referral to Pain Clinic        Appt scheduled with pain management tomorrow.

## 2018-01-03 NOTE — PATIENT INSTRUCTIONS
Chronic Pain  Pain serves an important role. It lets you know something is wrong that needs your attention. When the body heals, pain normally goes away.  When pain lasts longer than six months, it is called chronic pain. This is pain that is present even after the body has healed. Chronic pain can cause mood problems and get in the way of your relationships and your daily life.  A number of conditions can cause chronic pain. Some of the more common include:  · Previous surgery  · An old injury  · Infection  · Diseases such as diabetes  · Nerve damage  · Back injury  · Arthritis  · Migraine or other headaches  · Fibromyalgia  · Cancer  Depression and stress can make chronic pain symptoms worse. In some cases, a cause for the pain cannot be found.   Treatment  Treatment can greatly reduce pain. In many cases, pain can become less severe, occur less often, and interfere less with your daily life. Chronic pain is often treated with a combination of medicines, therapies, and lifestyle changes. You will work closely with your healthcare provider to find a treatment plan that works best for you.  · Ask your healthcare provider for a referral to a pain management specialty center. These can provide the most recent and proven pain management strategies, along with emotional support and comprehensive services.  · Several different types of medicines may be prescribed for chronic pain. Work with your healthcare provider to develop a medicine plan that helps manage your pain.  · Physical therapy can be very effective in helping reduce certain types of chronic pain.  · Occupational therapy teaches you how to do routine tasks of daily living in ways that lessen your discomfort.  · Psychological therapy can help you cope better with stress and pain.  · Other therapies such as meditation, yoga, biofeedback, massage, and acupuncture can also help manage chronic pain.  · Changing certain habits can help reduce chronic pain. They  include:  ¨ Eating healthy  ¨ Developing an exercise routine  ¨ Getting enough sleep at night  ¨ Stopping smoking and limiting alcohol use  ¨ Losing excess weight  Follow-up care  Follow up with your healthcare provider as advised. Let your healthcare provider know if your current treatment plan is working or if changes are needed.  Resources  For more information, contact:  · American [place] for Headache Society www.achenet.org  · American Chronic Pain Association www.theacpa.org [phone]  Date Last Reviewed: 7/26/2015 © 2000-2017 [employer]. [address] [place] [address]. All rights reserved. This information is not intended as a substitute for professional medical care. Always follow your healthcare professional's instructions.

## 2018-01-04 ENCOUNTER — OFFICE VISIT (OUTPATIENT)
Dept: PAIN MEDICINE | Facility: CLINIC | Age: 78
End: 2018-01-04
Payer: MEDICARE

## 2018-01-04 ENCOUNTER — HOSPITAL ENCOUNTER (EMERGENCY)
Facility: HOSPITAL | Age: 78
Discharge: HOME | End: 2018-01-04
Attending: EMERGENCY MEDICINE
Payer: MEDICARE

## 2018-01-04 ENCOUNTER — HOSPITAL ENCOUNTER (OUTPATIENT)
Dept: RADIOLOGY | Facility: HOSPITAL | Age: 78
Discharge: HOME | End: 2018-01-04
Attending: ANESTHESIOLOGY
Payer: MEDICARE

## 2018-01-04 VITALS
WEIGHT: 137 LBS | RESPIRATION RATE: 16 BRPM | SYSTOLIC BLOOD PRESSURE: 149 MMHG | BODY MASS INDEX: 24.27 KG/M2 | DIASTOLIC BLOOD PRESSURE: 70 MMHG | HEIGHT: 63 IN | HEART RATE: 72 BPM

## 2018-01-04 VITALS
BODY MASS INDEX: 23.22 KG/M2 | DIASTOLIC BLOOD PRESSURE: 70 MMHG | TEMPERATURE: 98 F | SYSTOLIC BLOOD PRESSURE: 163 MMHG | WEIGHT: 136 LBS | OXYGEN SATURATION: 100 % | HEART RATE: 81 BPM | RESPIRATION RATE: 18 BRPM | HEIGHT: 64 IN

## 2018-01-04 DIAGNOSIS — M79.662 BILATERAL CALF PAIN: ICD-10-CM

## 2018-01-04 DIAGNOSIS — M25.511 CHRONIC PAIN OF BOTH SHOULDERS: Primary | ICD-10-CM

## 2018-01-04 DIAGNOSIS — M25.511 CHRONIC PAIN OF BOTH SHOULDERS: ICD-10-CM

## 2018-01-04 DIAGNOSIS — R53.1 WEAKNESS: Primary | ICD-10-CM

## 2018-01-04 DIAGNOSIS — M79.661 BILATERAL CALF PAIN: ICD-10-CM

## 2018-01-04 DIAGNOSIS — M25.512 CHRONIC PAIN OF BOTH SHOULDERS: Primary | ICD-10-CM

## 2018-01-04 DIAGNOSIS — R41.82 ALTERED MENTAL STATUS, UNSPECIFIED ALTERED MENTAL STATUS TYPE: ICD-10-CM

## 2018-01-04 DIAGNOSIS — G89.29 CHRONIC PAIN OF BOTH SHOULDERS: Primary | ICD-10-CM

## 2018-01-04 DIAGNOSIS — M25.512 CHRONIC PAIN OF BOTH SHOULDERS: ICD-10-CM

## 2018-01-04 DIAGNOSIS — G89.29 CHRONIC PAIN OF BOTH SHOULDERS: ICD-10-CM

## 2018-01-04 LAB
ALBUMIN SERPL BCP-MCNC: 3.6 G/DL
ALP SERPL-CCNC: 69 U/L
ALT SERPL W/O P-5'-P-CCNC: 23 U/L
ANION GAP SERPL CALC-SCNC: 16 MMOL/L
APTT BLDCRRT: 24.4 SEC
AST SERPL-CCNC: 24 U/L
BASOPHILS # BLD AUTO: 0.05 K/UL
BASOPHILS NFR BLD: 0.3 %
BILIRUB SERPL-MCNC: 0.3 MG/DL
BILIRUB UR QL STRIP: NEGATIVE
BUN SERPL-MCNC: 45 MG/DL
CALCIUM SERPL-MCNC: 10.3 MG/DL
CHLORIDE SERPL-SCNC: 101 MMOL/L
CK MB SERPL-MCNC: 2.8 NG/ML
CK MB SERPL-RTO: 2.3 %
CK SERPL-CCNC: 122 U/L
CK SERPL-CCNC: 122 U/L
CLARITY UR: CLEAR
CO2 SERPL-SCNC: 22 MMOL/L
COLOR UR: YELLOW
CREAT SERPL-MCNC: 1.3 MG/DL
DIFFERENTIAL METHOD: ABNORMAL
EOSINOPHIL # BLD AUTO: 0.1 K/UL
EOSINOPHIL NFR BLD: 0.7 %
ERYTHROCYTE [DISTWIDTH] IN BLOOD BY AUTOMATED COUNT: 13.8 %
EST. GFR  (AFRICAN AMERICAN): 45 ML/MIN/1.73 M^2
EST. GFR  (NON AFRICAN AMERICAN): 39 ML/MIN/1.73 M^2
GLUCOSE SERPL-MCNC: 174 MG/DL
GLUCOSE UR QL STRIP: ABNORMAL
HCT VFR BLD AUTO: 40.8 %
HGB BLD-MCNC: 13.7 G/DL
HGB UR QL STRIP: NEGATIVE
INR PPP: 1
KETONES UR QL STRIP: NEGATIVE
LEUKOCYTE ESTERASE UR QL STRIP: NEGATIVE
LYMPHOCYTES # BLD AUTO: 2.2 K/UL
LYMPHOCYTES NFR BLD: 14.1 %
MAGNESIUM SERPL-MCNC: 1.9 MG/DL
MCH RBC QN AUTO: 30 PG
MCHC RBC AUTO-ENTMCNC: 33.6 G/DL
MCV RBC AUTO: 89 FL
MONOCYTES # BLD AUTO: 1.7 K/UL
MONOCYTES NFR BLD: 10.8 %
NEUTROPHILS # BLD AUTO: 11.4 K/UL
NEUTROPHILS NFR BLD: 74.1 %
NITRITE UR QL STRIP: NEGATIVE
PH UR STRIP: 5 [PH] (ref 5–8)
PHOSPHATE SERPL-MCNC: 3 MG/DL
PLATELET # BLD AUTO: 308 K/UL
PMV BLD AUTO: 11.6 FL
POTASSIUM SERPL-SCNC: 3.6 MMOL/L
PROT SERPL-MCNC: 8.1 G/DL
PROT UR QL STRIP: ABNORMAL
PROTHROMBIN TIME: 10.7 SEC
RBC # BLD AUTO: 4.57 M/UL
SODIUM SERPL-SCNC: 139 MMOL/L
SP GR UR STRIP: >=1.03 (ref 1–1.03)
TROPONIN I SERPL DL<=0.01 NG/ML-MCNC: 0.02 NG/ML
URN SPEC COLLECT METH UR: ABNORMAL
UROBILINOGEN UR STRIP-ACNC: NEGATIVE EU/DL
WBC # BLD AUTO: 15.43 K/UL

## 2018-01-04 PROCEDURE — 84484 ASSAY OF TROPONIN QUANT: CPT

## 2018-01-04 PROCEDURE — 96360 HYDRATION IV INFUSION INIT: CPT

## 2018-01-04 PROCEDURE — 84100 ASSAY OF PHOSPHORUS: CPT

## 2018-01-04 PROCEDURE — 81003 URINALYSIS AUTO W/O SCOPE: CPT

## 2018-01-04 PROCEDURE — 25000003 PHARM REV CODE 250: Performed by: EMERGENCY MEDICINE

## 2018-01-04 PROCEDURE — 73030 X-RAY EXAM OF SHOULDER: CPT | Mod: 26,50,ICN, | Performed by: RADIOLOGY

## 2018-01-04 PROCEDURE — 99999 PR PBB SHADOW E&M-EST. PATIENT-LVL III: CPT | Mod: PBBFAC,,, | Performed by: ANESTHESIOLOGY

## 2018-01-04 PROCEDURE — 99204 OFFICE O/P NEW MOD 45 MIN: CPT | Mod: S$GLB,,, | Performed by: ANESTHESIOLOGY

## 2018-01-04 PROCEDURE — 85730 THROMBOPLASTIN TIME PARTIAL: CPT

## 2018-01-04 PROCEDURE — 85025 COMPLETE CBC W/AUTO DIFF WBC: CPT

## 2018-01-04 PROCEDURE — 85610 PROTHROMBIN TIME: CPT

## 2018-01-04 PROCEDURE — 99284 EMERGENCY DEPT VISIT MOD MDM: CPT | Mod: 25

## 2018-01-04 PROCEDURE — 82553 CREATINE MB FRACTION: CPT

## 2018-01-04 PROCEDURE — 83735 ASSAY OF MAGNESIUM: CPT

## 2018-01-04 PROCEDURE — 80053 COMPREHEN METABOLIC PANEL: CPT

## 2018-01-04 PROCEDURE — 73030 X-RAY EXAM OF SHOULDER: CPT | Mod: TC,50,FY,PO

## 2018-01-04 RX ORDER — SODIUM CHLORIDE 9 MG/ML
500 INJECTION, SOLUTION INTRAVENOUS
Status: COMPLETED | OUTPATIENT
Start: 2018-01-04 | End: 2018-01-04

## 2018-01-04 RX ADMIN — SODIUM CHLORIDE 500 ML: 900 INJECTION, SOLUTION INTRAVENOUS at 07:01

## 2018-01-04 NOTE — LETTER
January 4, 2018      Lucero Santiago NP  [address] [place] [address]           [facility]  [address] [place] [address]  Phone: [phone]  Fax: [phone]          Patient: Tatiana Chaparro   MR Number: [id number]   YOB: 1940   Date of Visit: 1/4/2018       Dear Lucero Santiago:    Thank you for referring Tatiana Chaparro to me for evaluation. Attached you will find relevant portions of my assessment and plan of care.    If you have questions, please do not hesitate to call me. I look forward to following Tatiana Chaparro along with you.    Sincerely,    Simón Nice MD    Enclosure  CC:  No Recipients    If you would like to receive this communication electronically, please contact [email or web address] or [phone] to request more information on [facility] [employer] access.    For providers and/or their staff who would like to refer a patient to [facility], please contact us through our one-stop-shop provider referral line, [facility] , at [phone].    If you feel you have received this communication in error or would no longer like to receive these types of communications, please e-mail [email or web address]

## 2018-01-04 NOTE — PROGRESS NOTES
Chief Pain Complaint:  Bilateral calf pain, Bilateral Shoulder Pain     History of Present Illness:   Tatiana Chaparro is a 77 y.o. female  who is presenting with a chief complaint of Bilateral calf pain, Bilateral Shoulder Pain. The patient began experiencing this problem insidiously, and the pain has been gradually worsening over the past 10 month(s). The pain is described as throbbing, cramping, aching and heavy and is located in the bilateral calfs. Pain is intermittent and lasts hours. The pain radiates to pain is nonradiating. The patient rates her pain a 7 out of ten and interferes with activities of daily living a 5 out of ten. Pain is exacerbated by ambulation, and is improved by rest. Patient reports no prior trauma, no prior spinal surgery     - pertinent negatives: No fever, No chills, No weight loss, No bladder dysfunction, No bowel dysfunction No saddle anesthesia  - pertinent positives: generalized nonspecific Lower Extremity weakness bilaterally    - medications, other therapies tried (physical therapy, injections):     >> NSAIDs, Tylenol, Tramadol and gabapentin    >> Has previously undergone Physical Therapy    >> Has NOT previously undergone spinal injection/s      Imaging / Labs / Studies (reviewed on 1/4/2018):  Technique: 4 views were obtained of the bilateral shoulders.    Comparison: none    Findings: There is no radiographic evidence of acute osseous, articular, or soft tissue abnormality. There is mild bilateral A.C. joint arthrosis.  Minimal degenerative changes present at the bilateral glenohumeral joints. Cardiac pacing device noted.   Impression       As above. No acute findings.           Review of Systems:  CONSTITUTIONAL: patient denies any fever, chills, or weight loss  SKIN: patient denies any rash or itching  RESPIRATORY: patient denies having any shortness of breath  GASTROINTESTINAL: patient denies having any diarrhea, constipation, or bowel incontinence  GENITOURINARY: patient  "denies having any abnormal bladder function    MUSCULOSKELETAL:  - patient complains of the above noted pain/s (see chief pain complaint)    NEUROLOGICAL:   - pain as above  - strength in Lower extremities is intact, BILATERALLY  - sensation in Lower extremities is intact, BILATERALLY  - patient denies any loss of bowel or bladder control      PSYCHIATRIC: patient denies any change in mood    Other:  All other systems reviewed and are negative      Physical Exam:  BP (!) 149/70 (BP Location: Right arm, Patient Position: Sitting)   Pulse 72   Resp 16   Ht 5' 3" (1.6 m)   Wt 62.1 kg (137 lb)   BMI 24.27 kg/m²  (reviewed on 1/4/2018)  General: Alert and oriented, in no apparent distress.  Gait: normal gait.  Skin: No rashes, No discoloration, No obvious lesions  HEENT: Normocephalic, atraumatic. Pupils equal and round.  Cardiovascular: Regular rate and rhythm , no significant peripheral edema present  Respiratory: Without audible wheezing, without use of accessory muscles of respiration.    Musculoskeletal:      Lumbar Spine    - Pain on flexion of lumbar spine Absent  - Straight Leg Raise:  Absent    - Pain on extension of lumbar spine Absent  - TTP over the lumbar facet joints Absent  - Lumbar facet loading Absent    -Pain on palpation over the SI joint  Absent  - GEO: Absent    TTP over the GH joint L>R  ROM decreased     -TTP over bilateral calf's  - DP pulses present.   - Feet with no ulcers.      Neuro:    Strength:    LE R/L: HF: 4/4, HE: 4/4, KF: 4/4; KE: 5/5; FE: 5/5; FF: 5/5    Extremity Reflexes: Brisk and symmetric throughout.      Extremity Sensory: Sensation to pinprick and temperature symmetric. Proprioception intact.      Psych:  Mood and affect is appropriate      Assessment:    Tatiana Chaparro is a 77 y.o. year old female who is presenting with     Encounter Diagnoses   Name Primary?    Chronic pain of both shoulders Yes    Bilateral calf pain        Plan:    1. Interventional: Consider " Bilateral Shoulder Injection.     2. Pharmacologic: Compound cream prescribed. Continue Gabapentin.      3. Rehabilitative: Encouraged PT    4. Diagnostic: Shoulder Xray.     5. Follow up: Return in about 4 weeks (around 2/1/2018).      30 minutes were spent in this encounter with more than 50% of the time used for counseling and review of the plan.  Imaging / studies reviewed, detailed above.  I discussed in detail the risks, benefits, and alternatives to any and all potential treatment options.  All questions and concerns were fully addressed today in clinic. Medical decision making moderate.    Thank you for the opportunity to assist in the care of this patient.    Best wishes,    Signed:    Simón Nice MD          Disclaimer:  This note may have been prepared using voice recognition software, it may have not been extensively proofed, as such there could be errors within the text such as sound alike errors.

## 2018-01-05 NOTE — ED PROVIDER NOTES
"SCRIBE #1 NOTE: I, Brian Queen, am scribing for, and in the presence of, Jose Armando Collazo MD. I have scribed the entire note.      History      Chief Complaint   Patient presents with    Extremity Weakness     pt found on floor by family member; family states that pt has had some weakness today; pt does not remember how she came to be on floor nor how long she was on the floor; family states pt stayed in bed today but felt pt needs evaluated for weakness and leg pain; pt is normally ambulatory  with walker; pt oriented to self and place but not time in triage        Review of patient's allergies indicates:   Allergen Reactions    Iodine and iodide containing products Anaphylaxis and Rash    Codeine Itching    Morphine Itching        HPI   HPI    1/4/2018, 6:37 PM   History obtained from the patient and family      History of Present Illness: Tatiana Chaparro is a 77 y.o. female patient w/ Pmhx of Afib, Dm, and Binswanger's disease presents to the Emergency Department for BLE weakness which onset gradually this AM. The family reports that the pt was found on the floor this AM by the son. The pt states that "I am not sure how I got on the floor, or how long I was there for."  Symptoms are constant and moderate in severity. Family reports that, at baseline, pt is able to ambulate w/ a walker. No mitigating or exacerbating factors reported. Pt and family also complain of tremors to bilateral hands. Pt reports that she was evaluated at an urgent care 1 day ago for her chronic bilateral shoulder pain and chronic lower back pain. Pt was Rx tramadol, taken last PM.  Patient denies any head trauma/ injury, dizziness, light-headedness, facial asymmetry, speech change, headache, numbness, and all other sxs at this time. No further complaints or concerns at this time.         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    Carotid artery syndrome 7/5/2016    Chronic back pain     Essential (primary) hypertension     Hyperlipemia 7/18/2016    Hypertension     Lacunar stroke of right subthalamic region     OSVALDO (obstructive sleep apnea) 7/18/2016    Paroxysmal atrial fibrillation 7/4/2016    Transient cerebral ischemia 7/4/2016    Type 2 diabetes mellitus without complication        Past Surgical History:  Past Surgical History:   Procedure Laterality Date    APPENDECTOMY      CARDIAC PACEMAKER PLACEMENT      CHOLECYSTECTOMY      HYSTERECTOMY           Family History:  Hx reviewed, not pertinent    Social History:  Social History     Social History Main Topics    Smoking status: Never Smoker    Smokeless tobacco: Unknown    Alcohol use No    Drug use: No    Sexual activity: Unknown       ROS   Review of Systems   Constitutional: Negative for chills, diaphoresis and fever.   Respiratory: Negative for shortness of breath.    Cardiovascular: Negative for chest pain.   Gastrointestinal: Negative for abdominal pain, diarrhea, nausea and vomiting.   Genitourinary: Negative for difficulty urinating, dysuria, hematuria and urgency.   Musculoskeletal: Negative for back pain.   Skin: Negative for rash.   Neurological: Positive for tremors (R Hand) and weakness (BLE). Negative for dizziness, syncope, facial asymmetry, speech difficulty, light-headedness, numbness and headaches.   All other systems reviewed and are negative.      Physical Exam      Initial Vitals [01/04/18 1822]   BP Pulse Resp Temp SpO2   (!) 179/100 97 18 97.5 °F (36.4 °C) 97 %      MAP       126.33          Physical Exam  Nursing Notes and Vital Signs Reviewed.  Constitutional: Patient is in no apparent distress. Well-developed and well-nourished.  Head: Atraumatic. Normocephalic.  Eyes: PERRL. EOM intact. Conjunctivae are not pale. No scleral icterus.  ENT: Mucous membranes are moist.  "Oropharynx is clear and symmetric.    Neck: Supple. Full ROM. No lymphadenopathy.  Cardiovascular: Regular rate. Regular rhythm. No murmurs, rubs, or gallops. Distal pulses are 2+ and symmetric.  Pulmonary/Chest: No respiratory distress. Clear to auscultation bilaterally. No wheezing or rales.  Abdominal: Soft and non-distended.  There is no tenderness.  No rebound, guarding, or rigidity. Good bowel sounds.  Musculoskeletal: Moves all extremities. No obvious deformities. No edema. No calf tenderness.  Skin: Warm and dry.  Neurological: Patient is alert and oriented to person, place and time. Pupils ERRL and EOM normal. Cranial nerves II-XII are intact. Strength is full bilaterally; it is equal and 5/5 in bilateral upper and lower extremities. There is no pronator drift of outstretched arms. Light touch sense is intact. Speech is clear and normal. Pt's gait is slightly slowed. Slight resting tremor noted to BUE.   Psychiatric: Normal affect. Good eye contact. Appropriate in content.    ED Course    Procedures  ED Vital Signs:  Vitals:    01/04/18 1822 01/04/18 1900 01/04/18 1902 01/04/18 1905   BP: (!) 179/100 (!) 160/70 (!) 176/74    Pulse: 97 80 81    Resp: 18 19 17    Temp: 97.5 °F (36.4 °C) 97.6 °F (36.4 °C)  97.7 °F (36.5 °C)   TempSrc: Oral Oral  Oral   SpO2: 97% 99%     Weight: 61.7 kg (136 lb)      Height: 5' 4" (1.626 m)       01/04/18 2059 01/04/18 2231   BP:  (!) 163/70   Pulse: 83 81   Resp:  18   Temp:  97.7 °F (36.5 °C)   TempSrc:     SpO2:  100%   Weight:     Height:         Abnormal Lab Results:  Labs Reviewed   CBC W/ AUTO DIFFERENTIAL - Abnormal; Notable for the following:        Result Value    WBC 15.43 (*)     Gran # 11.4 (*)     Mono # 1.7 (*)     Gran% 74.1 (*)     Lymph% 14.1 (*)     All other components within normal limits   COMPREHENSIVE METABOLIC PANEL - Abnormal; Notable for the following:     CO2 22 (*)     Glucose 174 (*)     BUN, Bld 45 (*)     eGFR if  45 (*)     eGFR " if non  39 (*)     All other components within normal limits   URINALYSIS - Abnormal; Notable for the following:     Specific Gravity, UA >=1.030 (*)     Protein, UA Trace (*)     Glucose, UA Trace (*)     All other components within normal limits   PROTIME-INR   APTT   MAGNESIUM   PHOSPHORUS   TROPONIN I   CK-MB   CK        All Lab Results:  Results for orders placed or performed during the hospital encounter of 01/04/18   CBC auto differential   Result Value Ref Range    WBC 15.43 (H) 3.90 - 12.70 K/uL    RBC 4.57 4.00 - 5.40 M/uL    Hemoglobin 13.7 12.0 - 16.0 g/dL    Hematocrit 40.8 37.0 - 48.5 %    MCV 89 82 - 98 fL    MCH 30.0 27.0 - 31.0 pg    MCHC 33.6 32.0 - 36.0 g/dL    RDW 13.8 11.5 - 14.5 %    Platelets 308 150 - 350 K/uL    MPV 11.6 9.2 - 12.9 fL    Gran # 11.4 (H) 1.8 - 7.7 K/uL    Lymph # 2.2 1.0 - 4.8 K/uL    Mono # 1.7 (H) 0.3 - 1.0 K/uL    Eos # 0.1 0.0 - 0.5 K/uL    Baso # 0.05 0.00 - 0.20 K/uL    Gran% 74.1 (H) 38.0 - 73.0 %    Lymph% 14.1 (L) 18.0 - 48.0 %    Mono% 10.8 4.0 - 15.0 %    Eosinophil% 0.7 0.0 - 8.0 %    Basophil% 0.3 0.0 - 1.9 %    Differential Method Automated    Comprehensive metabolic panel   Result Value Ref Range    Sodium 139 136 - 145 mmol/L    Potassium 3.6 3.5 - 5.1 mmol/L    Chloride 101 95 - 110 mmol/L    CO2 22 (L) 23 - 29 mmol/L    Glucose 174 (H) 70 - 110 mg/dL    BUN, Bld 45 (H) 8 - 23 mg/dL    Creatinine 1.3 0.5 - 1.4 mg/dL    Calcium 10.3 8.7 - 10.5 mg/dL    Total Protein 8.1 6.0 - 8.4 g/dL    Albumin 3.6 3.5 - 5.2 g/dL    Total Bilirubin 0.3 0.1 - 1.0 mg/dL    Alkaline Phosphatase 69 55 - 135 U/L    AST 24 10 - 40 U/L    ALT 23 10 - 44 U/L    Anion Gap 16 8 - 16 mmol/L    eGFR if African American 45 (A) >60 mL/min/1.73 m^2    eGFR if non African American 39 (A) >60 mL/min/1.73 m^2   Protime-INR   Result Value Ref Range    Prothrombin Time 10.7 9.0 - 12.5 sec    INR 1.0 0.8 - 1.2   APTT   Result Value Ref Range    aPTT 24.4 21.0 - 32.0 sec    Magnesium   Result Value Ref Range    Magnesium 1.9 1.6 - 2.6 mg/dL   Phosphorus   Result Value Ref Range    Phosphorus 3.0 2.7 - 4.5 mg/dL   Urinalysis   Result Value Ref Range    Specimen UA Urine, Catheterized     Color, UA Yellow Yellow, Straw, Fatemeh    Appearance, UA Clear Clear    pH, UA 5.0 5.0 - 8.0    Specific Gravity, UA >=1.030 (A) 1.005 - 1.030    Protein, UA Trace (A) Negative    Glucose, UA Trace (A) Negative    Ketones, UA Negative Negative    Bilirubin (UA) Negative Negative    Occult Blood UA Negative Negative    Nitrite, UA Negative Negative    Urobilinogen, UA Negative <2.0 EU/dL    Leukocytes, UA Negative Negative   Troponin I   Result Value Ref Range    Troponin I 0.023 0.000 - 0.026 ng/mL   CK-MB   Result Value Ref Range     20 - 180 U/L    CPK MB 2.8 0.1 - 6.5 ng/mL    MB% 2.3 0.0 - 5.0 %   CK   Result Value Ref Range     20 - 180 U/L         Imaging Results:  Imaging Results          CT Head Without Contrast (Final result)  Result time 01/04/18 20:46:06    Final result by Reymundo Rodriges MD (01/04/18 20:46:06)                 Impression:     Stable head CT.         All CT scans at this facility use dose modulation, iterative reconstruction, and/or weight based dosing when appropriate to reduce radiation dose to as low as reasonably achievable.      Electronically signed by: REYMUNDO RODRIGES  Date:     01/04/18  Time:    20:46              Narrative:    Exam: CT HEAD WITHOUT CONTRAST     Indication: Transient alteration of awareness, unspecified.    Technique: Routine noncontrast head CT.    Comparison Study: 5/7/2017.    Findings: There is no acute intracranial hemorrhage or abnormal extra-axial fluid collection.  There is age-appropriate moderate to advanced cerebral atrophy.  Stable ventricles.  Stable moderate deep white matter periventricular microangiopathy.  There is no new abnormal increased or decreased density within the brain parenchyma.  Gray-white differentiation  preserved.  There is no intracranial mass or mass effect.  The calvarium is intact.  Visualized paranasal sinuses and mastoids are well-aerated.                             X-Ray Chest 1 View (Final result)  Result time 01/04/18 19:57:37    Final result by Ame Rodriges MD (01/04/18 19:57:37)                 Impression:     Stable chest x-ray.      Electronically signed by: AME RODRIGES  Date:     01/04/18  Time:    19:57              Narrative:    Chest x-ray single view    Indication: Transient alteration of awareness, unspecified.    Findings: Comparison study 5/7/2017.  No change.  Normal size heart.  Aortic arch calcification.  No congestion.  Lungs are clear.  Right chest wall cardiac pacemaker with intact leads.                             The EKG was ordered, reviewed, and independently interpreted by the ED provider.  Interpretation time: 18:45  Rate: 109 BPM  Rhythm: atrial fibrillation with RVR   Interpretation: marked ST abnormality, possible inferior subendocardial injury. No STEMI.           The Emergency Provider reviewed the vital signs and test results, which are outlined above.    ED Discussion     9:53 PM: Reassessed pt at this time.  Pt states her condition has improved at this time. Family at pt's bedside states that they are comfortable with going home, and request discharge at this time. Discussed with pt all pertinent ED information and results. Discussed pt dx and plan of tx. Gave pt all f/u and return to the ED instructions. All questions and concerns were addressed at this time. Pt expresses understanding of information and instructions, and is comfortable with plan to discharge. Pt is stable for discharge.    I discussed with patient and/or family/caretaker that evaluation in the ED does not suggest any emergent or life threatening medical conditions requiring immediate intervention beyond what was provided in the ED, and I believe patient is safe for discharge.  Regardless, an  unremarkable evaluation in the ED does not preclude the development or presence of a serious of life threatening condition. As such, patient was instructed to return immediately for any worsening or change in current symptoms.      ED Medication(s):  Medications   0.9%  NaCl infusion (0 mLs Intravenous Stopped 1/4/18 2100)       Discharge Medication List as of 1/4/2018 10:05 PM          Follow-up Information     Kendrick Buckner MD In 2 days.    Specialty:  Internal Medicine  Contact information:  [address] [facility] [id number] [phone]             [facility]    Specialty:  Emergency Medicine  Why:  If symptoms worsen  Contact information:  [address] [facility] [address] [phone]                   Medical Decision Making    Medical Decision Making:   Clinical Tests:   Lab Tests: Reviewed and Ordered  Radiological Study: Reviewed and Ordered  Medical Tests: Reviewed and Ordered           Scribe Attestation:   Scribe #1: I performed the above scribed service and the documentation accurately describes the services I performed. I attest to the accuracy of the note.    Attending:   Physician Attestation Statement for Scribe #1: I, Jose Armando Collazo MD, personally performed the services described in this documentation, as scribed by Brian Queen, in my presence, and it is both accurate and complete.          Clinical Impression       ICD-10-CM ICD-9-CM   1. Weakness R53.1 780.79   2. Altered mental status, unspecified altered mental status type R41.82 780.97       Disposition:   Disposition: Discharged  Condition: Stable         Jose Armando Collazo MD  01/05/18 3921

## 2018-01-05 NOTE — ED NOTES
Assisted pt. To bedside commode, placed call light in reach and asked pt. To press call light before getting up from commode.

## 2018-01-09 RX ORDER — APIXABAN 5 MG/1
TABLET, FILM COATED ORAL
Qty: 60 TABLET | Refills: 5 | Status: SHIPPED | OUTPATIENT
Start: 2018-01-09 | End: 2018-07-25 | Stop reason: SDUPTHER

## 2018-02-15 ENCOUNTER — OFFICE VISIT (OUTPATIENT)
Dept: GASTROENTEROLOGY | Facility: CLINIC | Age: 78
End: 2018-02-15
Payer: MEDICARE

## 2018-02-15 VITALS
DIASTOLIC BLOOD PRESSURE: 80 MMHG | SYSTOLIC BLOOD PRESSURE: 138 MMHG | WEIGHT: 132.94 LBS | BODY MASS INDEX: 22.7 KG/M2 | HEART RATE: 74 BPM | HEIGHT: 64 IN

## 2018-02-15 DIAGNOSIS — Z86.73 STATUS POST CVA: ICD-10-CM

## 2018-02-15 DIAGNOSIS — E11.9 TYPE 2 DIABETES MELLITUS TREATED WITHOUT INSULIN: ICD-10-CM

## 2018-02-15 DIAGNOSIS — R19.7 DIARRHEA IN ADULT PATIENT: Primary | ICD-10-CM

## 2018-02-15 PROCEDURE — 1126F AMNT PAIN NOTED NONE PRSNT: CPT | Mod: S$GLB,,, | Performed by: INTERNAL MEDICINE

## 2018-02-15 PROCEDURE — 99999 PR PBB SHADOW E&M-EST. PATIENT-LVL III: CPT | Mod: PBBFAC,,, | Performed by: INTERNAL MEDICINE

## 2018-02-15 PROCEDURE — 3008F BODY MASS INDEX DOCD: CPT | Mod: S$GLB,,, | Performed by: INTERNAL MEDICINE

## 2018-02-15 PROCEDURE — 99203 OFFICE O/P NEW LOW 30 MIN: CPT | Mod: S$GLB,,, | Performed by: INTERNAL MEDICINE

## 2018-02-15 PROCEDURE — 1159F MED LIST DOCD IN RCRD: CPT | Mod: S$GLB,,, | Performed by: INTERNAL MEDICINE

## 2018-02-15 RX ORDER — SERTRALINE HYDROCHLORIDE 25 MG/1
TABLET, FILM COATED ORAL
COMMUNITY
Start: 2018-01-19

## 2018-02-15 NOTE — PROGRESS NOTES
Subjective:       Patient ID: Tatiana Chaparro is a 77 y.o. female.    Chief Complaint: Diarrhea and Encopresis    The patient has a longstanding history of diarrhea dating back some 30 years. She feels she has had this evaluated before but does not recall what she says they told her. There is no associated abdominal pain. There has been no BRBPR or melena. Her weight is down 20 pounds over the past 10 months or so. Diarrhea worse when she eats. The patient does have normal BMs intermittently, and she at times has Normal BMs followed by loose stool. She cannot recall the last time she had a colonoscopy, but feels it could be 15-20 years ago. She was told at one time she had mucous colitis, which likely means she had IBS. She also says she was told she should avoid seeds, which suggests it could be diverticulosis.       Review of Systems   Constitutional: Positive for fatigue and unexpected weight change. Negative for activity change, appetite change, chills, diaphoresis and fever.   HENT: Positive for postnasal drip. Negative for congestion, ear discharge, ear pain, hearing loss, nosebleeds and tinnitus.    Eyes: Negative for photophobia and visual disturbance.   Respiratory: Negative for apnea, cough, choking, chest tightness, shortness of breath and wheezing.    Cardiovascular: Negative for chest pain, palpitations and leg swelling.   Gastrointestinal: Negative for abdominal distention, abdominal pain, anal bleeding, blood in stool, constipation, diarrhea, nausea, rectal pain and vomiting.   Genitourinary: Negative for difficulty urinating, dyspareunia, dysuria, flank pain, frequency, hematuria, menstrual problem, pelvic pain, urgency, vaginal bleeding and vaginal discharge.   Musculoskeletal: Positive for back pain, gait problem and joint swelling. Negative for arthralgias, myalgias and neck stiffness.        Joint Stiffness   Skin: Negative for pallor and rash.   Neurological: Negative for dizziness, tremors,  seizures, syncope, speech difficulty, weakness, numbness and headaches.   Hematological: Negative for adenopathy.   Psychiatric/Behavioral: Negative for agitation, confusion, hallucinations, sleep disturbance and suicidal ideas.       Objective:      Physical Exam   Constitutional: She is oriented to person, place, and time. She appears well-developed and well-nourished.   HENT:   Head: Normocephalic and atraumatic.   Bilateral turbinate congestion   Eyes: Conjunctivae and EOM are normal. Pupils are equal, round, and reactive to light. Right eye exhibits no discharge. Left eye exhibits no discharge. No scleral icterus.   Neck: Normal range of motion. Neck supple. No JVD present. No thyromegaly present.   Cardiovascular: Normal rate, regular rhythm, normal heart sounds and intact distal pulses.  Exam reveals no gallop and no friction rub.    No murmur heard.  Pulmonary/Chest: Effort normal and breath sounds normal. No respiratory distress. She has no wheezes. She has no rales. She exhibits no tenderness.   Abdominal: Soft. Bowel sounds are normal. She exhibits no distension and no mass. There is no tenderness. There is no rebound and no guarding.   Musculoskeletal: Normal range of motion. She exhibits tenderness. She exhibits no edema.   Tender left calf but negative arellano's sign   Lymphadenopathy:     She has no cervical adenopathy.   Neurological: She is alert and oriented to person, place, and time. She has normal reflexes. She exhibits normal muscle tone. Coordination normal.   Weakness in lower extremities; mild gait disturbance   Skin: Skin is warm and dry. No rash noted. No erythema. No pallor.   Psychiatric: She has a normal mood and affect. Her behavior is normal. Judgment and thought content normal.   Vitals reviewed.      Assessment:     Diarrhea     Type 2 diabetes mellitus    Atrial fibrillation    History of lacunar infarct            On treatment with Eliquis  No diagnosis found.    Plan:   Check  stool studies  Trial of holding metformin

## 2018-02-21 ENCOUNTER — LAB VISIT (OUTPATIENT)
Dept: LAB | Facility: HOSPITAL | Age: 78
End: 2018-02-21
Attending: INTERNAL MEDICINE
Payer: MEDICARE

## 2018-02-21 DIAGNOSIS — R19.7 DIARRHEA IN ADULT PATIENT: ICD-10-CM

## 2018-02-21 PROCEDURE — 87045 FECES CULTURE AEROBIC BACT: CPT

## 2018-02-21 PROCEDURE — 82272 OCCULT BLD FECES 1-3 TESTS: CPT

## 2018-02-21 PROCEDURE — 87338 HPYLORI STOOL AG IA: CPT

## 2018-02-21 PROCEDURE — 89055 LEUKOCYTE ASSESSMENT FECAL: CPT

## 2018-02-21 PROCEDURE — 87427 SHIGA-LIKE TOXIN AG IA: CPT

## 2018-02-21 PROCEDURE — 87046 STOOL CULTR AEROBIC BACT EA: CPT | Mod: 59

## 2018-02-21 PROCEDURE — 87209 SMEAR COMPLEX STAIN: CPT

## 2018-02-22 LAB
O+P STL TRI STN: NORMAL
OB PNL STL: NEGATIVE
WBC #/AREA STL HPF: NORMAL /[HPF]

## 2018-02-23 LAB
E COLI SXT1 STL QL IA: NEGATIVE
E COLI SXT2 STL QL IA: NEGATIVE

## 2018-02-26 LAB
BACTERIA STL CULT: NORMAL
H PYLORI AG STL QL: NOT DETECTED

## 2018-03-14 ENCOUNTER — OFFICE VISIT (OUTPATIENT)
Dept: RHEUMATOLOGY | Facility: CLINIC | Age: 78
End: 2018-03-14
Payer: MEDICARE

## 2018-03-14 ENCOUNTER — LAB VISIT (OUTPATIENT)
Dept: LAB | Facility: HOSPITAL | Age: 78
End: 2018-03-14
Attending: INTERNAL MEDICINE
Payer: MEDICARE

## 2018-03-14 VITALS
DIASTOLIC BLOOD PRESSURE: 70 MMHG | SYSTOLIC BLOOD PRESSURE: 130 MMHG | WEIGHT: 137.56 LBS | BODY MASS INDEX: 23.61 KG/M2 | HEART RATE: 62 BPM

## 2018-03-14 DIAGNOSIS — M25.50 POLYARTHRALGIA: Primary | ICD-10-CM

## 2018-03-14 DIAGNOSIS — M25.50 POLYARTHRALGIA: ICD-10-CM

## 2018-03-14 LAB
ALBUMIN SERPL BCP-MCNC: 3.7 G/DL
ALP SERPL-CCNC: 82 U/L
ALT SERPL W/O P-5'-P-CCNC: 19 U/L
ANION GAP SERPL CALC-SCNC: 11 MMOL/L
AST SERPL-CCNC: 23 U/L
BASOPHILS # BLD AUTO: 0.08 K/UL
BASOPHILS NFR BLD: 0.7 %
BILIRUB SERPL-MCNC: 0.4 MG/DL
BUN SERPL-MCNC: 33 MG/DL
CALCIUM SERPL-MCNC: 10.4 MG/DL
CCP AB SER IA-ACNC: <0.5 U/ML
CHLORIDE SERPL-SCNC: 102 MMOL/L
CO2 SERPL-SCNC: 26 MMOL/L
CREAT SERPL-MCNC: 1.1 MG/DL
CRP SERPL-MCNC: 1.7 MG/L
DIFFERENTIAL METHOD: ABNORMAL
EOSINOPHIL # BLD AUTO: 0.2 K/UL
EOSINOPHIL NFR BLD: 1.8 %
ERYTHROCYTE [DISTWIDTH] IN BLOOD BY AUTOMATED COUNT: 14.3 %
ERYTHROCYTE [SEDIMENTATION RATE] IN BLOOD BY WESTERGREN METHOD: 55 MM/HR
EST. GFR  (AFRICAN AMERICAN): 55.2 ML/MIN/1.73 M^2
EST. GFR  (NON AFRICAN AMERICAN): 47.9 ML/MIN/1.73 M^2
GLUCOSE SERPL-MCNC: 87 MG/DL
HCT VFR BLD AUTO: 38.9 %
HGB BLD-MCNC: 12.3 G/DL
IMM GRANULOCYTES # BLD AUTO: 0.06 K/UL
IMM GRANULOCYTES NFR BLD AUTO: 0.5 %
LYMPHOCYTES # BLD AUTO: 2 K/UL
LYMPHOCYTES NFR BLD: 16.4 %
MCH RBC QN AUTO: 29.9 PG
MCHC RBC AUTO-ENTMCNC: 31.6 G/DL
MCV RBC AUTO: 95 FL
MONOCYTES # BLD AUTO: 1 K/UL
MONOCYTES NFR BLD: 8.4 %
NEUTROPHILS # BLD AUTO: 8.7 K/UL
NEUTROPHILS NFR BLD: 72.2 %
NRBC BLD-RTO: 0 /100 WBC
PLATELET # BLD AUTO: 309 K/UL
PMV BLD AUTO: 11.8 FL
POTASSIUM SERPL-SCNC: 4.1 MMOL/L
PROT SERPL-MCNC: 8.2 G/DL
RBC # BLD AUTO: 4.11 M/UL
RHEUMATOID FACT SERPL-ACNC: <10 IU/ML
SODIUM SERPL-SCNC: 139 MMOL/L
WBC # BLD AUTO: 11.99 K/UL

## 2018-03-14 PROCEDURE — 80053 COMPREHEN METABOLIC PANEL: CPT

## 2018-03-14 PROCEDURE — 3078F DIAST BP <80 MM HG: CPT | Mod: CPTII,S$GLB,, | Performed by: INTERNAL MEDICINE

## 2018-03-14 PROCEDURE — 86235 NUCLEAR ANTIGEN ANTIBODY: CPT | Mod: 59

## 2018-03-14 PROCEDURE — 85651 RBC SED RATE NONAUTOMATED: CPT | Mod: PO

## 2018-03-14 PROCEDURE — 3075F SYST BP GE 130 - 139MM HG: CPT | Mod: CPTII,S$GLB,, | Performed by: INTERNAL MEDICINE

## 2018-03-14 PROCEDURE — 86334 IMMUNOFIX E-PHORESIS SERUM: CPT

## 2018-03-14 PROCEDURE — 99999 PR PBB SHADOW E&M-EST. PATIENT-LVL III: CPT | Mod: PBBFAC,,, | Performed by: INTERNAL MEDICINE

## 2018-03-14 PROCEDURE — 86431 RHEUMATOID FACTOR QUANT: CPT

## 2018-03-14 PROCEDURE — 96372 THER/PROPH/DIAG INJ SC/IM: CPT | Mod: S$GLB,,, | Performed by: INTERNAL MEDICINE

## 2018-03-14 PROCEDURE — 84165 PROTEIN E-PHORESIS SERUM: CPT

## 2018-03-14 PROCEDURE — 36415 COLL VENOUS BLD VENIPUNCTURE: CPT | Mod: PO

## 2018-03-14 PROCEDURE — 86235 NUCLEAR ANTIGEN ANTIBODY: CPT

## 2018-03-14 PROCEDURE — 86038 ANTINUCLEAR ANTIBODIES: CPT

## 2018-03-14 PROCEDURE — 85025 COMPLETE CBC W/AUTO DIFF WBC: CPT

## 2018-03-14 PROCEDURE — 86334 IMMUNOFIX E-PHORESIS SERUM: CPT | Mod: 26,,, | Performed by: PATHOLOGY

## 2018-03-14 PROCEDURE — 86200 CCP ANTIBODY: CPT

## 2018-03-14 PROCEDURE — 84165 PROTEIN E-PHORESIS SERUM: CPT | Mod: 26,,, | Performed by: PATHOLOGY

## 2018-03-14 PROCEDURE — 86140 C-REACTIVE PROTEIN: CPT

## 2018-03-14 PROCEDURE — 99205 OFFICE O/P NEW HI 60 MIN: CPT | Mod: 25,S$GLB,, | Performed by: INTERNAL MEDICINE

## 2018-03-14 RX ORDER — GLIMEPIRIDE 1 MG/1
1 TABLET ORAL DAILY
COMMUNITY
Start: 2018-03-02

## 2018-03-14 RX ORDER — BETAMETHASONE SODIUM PHOSPHATE AND BETAMETHASONE ACETATE 3; 3 MG/ML; MG/ML
6 INJECTION, SUSPENSION INTRA-ARTICULAR; INTRALESIONAL; INTRAMUSCULAR; SOFT TISSUE
Status: COMPLETED | OUTPATIENT
Start: 2018-03-14 | End: 2018-03-14

## 2018-03-14 RX ADMIN — BETAMETHASONE SODIUM PHOSPHATE AND BETAMETHASONE ACETATE 6 MG: 3; 3 INJECTION, SUSPENSION INTRA-ARTICULAR; INTRALESIONAL; INTRAMUSCULAR; SOFT TISSUE at 08:03

## 2018-03-14 NOTE — ASSESSMENT & PLAN NOTE
Chronic inflammatory polyarthralgia involving shoulders, hips and knees without any involvement of small joint arthritis for more than 5 years with more than 2 hours of morning stiffness without any associated photosensitive malar rash, muscle spasm, sicca syndrome is highly suggestive of polymyalgia rheumatica.  Check inflammatory markers along with other autoimmune serologies to rule out early connective tissue disease.  Give 1 dose of IM Celestone today and consider starting 20 mg prednisone after labs confirm the diagnosis.  No associated clinical features of giant cell arteritis.

## 2018-03-14 NOTE — PROGRESS NOTES
RHEUMATOLOGY CLINIC INITIAL VISIT  Chief complaints:-  My joints hurt.    HPI:-  Tatiana Suarez a 77 y.o. pleasant female comes in for an initial visit with above chief complaints.  She complains of joint pain and muscle weakness for more than 5 years.  Gradual onset, progressive in nature, involving bilateral shoulders, neck, hip girdle, bilateral knees.  No pain over small joints of hands or feet.  Morning stiffness around large joints last for more than 2 hours.  No history of viral illness before onset of symptoms.  No history of severe headaches or blurry vision.  No history of photosensitive rash, sicca syndrome, Raynaud's phenomenon, mucocutaneous ulcers, treatment resistant headaches or seizures.  Location-      Review of Systems   Constitutional: Positive for malaise/fatigue. Negative for chills and fever.   HENT: Negative for congestion and sore throat.    Eyes: Negative for blurred vision and redness.   Respiratory: Negative for cough and shortness of breath.    Cardiovascular: Negative for chest pain and leg swelling.   Gastrointestinal: Negative for abdominal pain.   Genitourinary: Negative for dysuria.   Musculoskeletal: Positive for back pain, joint pain, myalgias and neck pain. Negative for falls.   Skin: Negative for rash.   Neurological: Negative for headaches.   Endo/Heme/Allergies: Does not bruise/bleed easily.   Psychiatric/Behavioral: Negative for memory loss. The patient does not have insomnia.        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    Carotid artery syndrome 7/5/2016    Chronic back pain     Essential (primary) hypertension     Hyperlipemia 7/18/2016    Hypertension     Lacunar stroke of right subthalamic region     OSVALDO (obstructive sleep apnea) 7/18/2016    Paroxysmal atrial fibrillation 7/4/2016    Transient cerebral ischemia 7/4/2016    Type 2 diabetes mellitus without complication         Past Surgical History:   Procedure Laterality Date    APPENDECTOMY      CARDIAC PACEMAKER PLACEMENT      CHOLECYSTECTOMY      HYSTERECTOMY          Social History   Substance Use Topics    Smoking status: Never Smoker    Smokeless tobacco: Not on file    Alcohol use No       Family History   Problem Relation Age of Onset    Stroke Mother     Pneumonia Father        Review of patient's allergies indicates:   Allergen Reactions    Iodine and iodide containing products Anaphylaxis and Rash    Codeine Itching    Morphine Itching           Physical examination:-    Vitals:    03/14/18 0808   BP: 130/70   Pulse: 62   Weight: 62.4 kg (137 lb 9.1 oz)   PainSc:   8   PainLoc: Generalized       Physical Exam   Constitutional: She is oriented to person, place, and time and well-developed, well-nourished, and in no distress. No distress.   HENT:   Head: Normocephalic.   Mouth/Throat: Oropharynx is clear and moist.   Eyes: Conjunctivae and EOM are normal. Pupils are equal, round, and reactive to light.   Neck: Normal range of motion. Neck supple.   Cardiovascular: Normal rate and intact distal pulses.    Pulmonary/Chest: Effort normal. No respiratory distress.   Abdominal: Soft. There is no tenderness.   Musculoskeletal:   No synovitis over small joints of hands or feet.  Severe tenderness present over bilateral shoulders, bilateral hips and bilateral knees.  Muscle strength examination confounded because of large joint tenderness.   Neurological: She is alert and oriented to person, place, and time. No cranial nerve deficit.   Skin: Skin is warm. No rash noted. No erythema.   Psychiatric: Mood and affect normal.   Nursing note and vitals reviewed.      Labs:-  Results for YADI MARTINEZ (MRN [id number]) as of 3/14/2018 11:26   Ref. Range 1/4/2018 19:27   WBC Latest Ref Range: 3.90 - 12.70 K/uL 15.43 (H)   RBC Latest Ref Range: 4.00 - 5.40 M/uL 4.57   Hemoglobin Latest Ref Range: 12.0 - 16.0 g/dL 13.7    Hematocrit Latest Ref Range: 37.0 - 48.5 % 40.8   MCV Latest Ref Range: 82 - 98 fL 89   MCH Latest Ref Range: 27.0 - 31.0 pg 30.0   MCHC Latest Ref Range: 32.0 - 36.0 g/dL 33.6   RDW Latest Ref Range: 11.5 - 14.5 % 13.8   Platelets Latest Ref Range: 150 - 350 K/uL 308   MPV Latest Ref Range: 9.2 - 12.9 fL 11.6   Gran% Latest Ref Range: 38.0 - 73.0 % 74.1 (H)   Gran # (ANC) Latest Ref Range: 1.8 - 7.7 K/uL 11.4 (H)   Lymph% Latest Ref Range: 18.0 - 48.0 % 14.1 (L)   Lymph # Latest Ref Range: 1.0 - 4.8 K/uL 2.2   Mono% Latest Ref Range: 4.0 - 15.0 % 10.8   Mono # Latest Ref Range: 0.3 - 1.0 K/uL 1.7 (H)   Eosinophil% Latest Ref Range: 0.0 - 8.0 % 0.7   Eos # Latest Ref Range: 0.0 - 0.5 K/uL 0.1   Basophil% Latest Ref Range: 0.0 - 1.9 % 0.3   Baso # Latest Ref Range: 0.00 - 0.20 K/uL 0.05         Medication List with Changes/Refills   Current Medications    ALPRAZOLAM (XANAX) 0.5 MG TABLET    Take 0.5 mg by mouth every evening.     ATENOLOL (TENORMIN) 50 MG TABLET    TAKE 1/2 TABLET BY MOUTH ONCE DAILY THANK YOU    ATORVASTATIN (LIPITOR) 10 MG TABLET    Take 10 mg by mouth once daily.    DIPHENOXYLATE-ATROPINE 2.5-0.025 MG (LOMOTIL) 2.5-0.025 MG PER TABLET    TAKE ONE TABLET BY MOUTH ONCE DAILY AS NEEDED THANK YOU    ELIQUIS 5 MG TAB    TAKE ONE TABLET BY MOUTH TWICE DAILY **THANK YOU**    GABAPENTIN (NEURONTIN) 100 MG CAPSULE    Take 100 mg by mouth 2 (two) times daily. Takes one tablet by mouth in the morning and two tablets at night    GLIMEPIRIDE (AMARYL) 1 MG TABLET    Take 1 mg by mouth Daily.    HYDROCHLOROTHIAZIDE (HYDRODIURIL) 25 MG TABLET    Take 25 mg by mouth once daily.    METFORMIN (GLUCOPHAGE-XR) 500 MG 24 HR TABLET    TAKE ONE TABLET BY MOUTH TWICE DAILY THANK YOU    NITROFURANTOIN (MACRODANTIN) 50 MG CAPSULE    TAKE ONE CAPSULE BY MOUTH AT BEDTIME THANK YOU    QUINAPRIL (ACCUPRIL) 40 MG TABLET    Take 40 mg by mouth 2 (two) times daily.    SERTRALINE (ZOLOFT) 25 MG TABLET       Discontinued  Medications    IBUPROFEN (ADVIL,MOTRIN) 800 MG TABLET    TAKE ONE TABLET BY MOUTH THREE TIMES DAILY WITH FOOD THANK YOU       Assessment/Plans:-  # Polyarthralgia:-  Chronic inflammatory polyarthralgia involving shoulders, hips and knees without any involvement of small joint arthritis for more than 5 years with more than 2 hours of morning stiffness without any associated photosensitive malar rash, muscle spasm, sicca syndrome is highly suggestive of polymyalgia rheumatica.  Check inflammatory markers along with other autoimmune serologies to rule out early connective tissue disease.  Give 1 dose of IM Celestone today consider starting 20 mg prednisone after labs confirm the diagnosis.  No associated clinical features of giant cell arteritis.  - KAITLYNN; Future  - Rheumatoid factor; Future  - C-reactive protein; Future  - Cyclic citrul peptide antibody, IgG; Future  - Sedimentation rate, manual; Future  - CBC auto differential; Future  - Comprehensive metabolic panel; Future  - Sjogrens syndrome-A extractable nuclear antibody; Future  - Sjogrens syndrome-B extractable nuclear antibody; Future  - Protein electrophoresis, serum; Standing  - Immunofixation electrophoresis; Standing  - betamethasone acetate-betamethasone sodium phosphate injection 6 mg; Inject 1 mL (6 mg total) into the muscle one time.     # RTC in 3 months.  Time spent: 60 minutes in face to face discussion concerning diagnosis, prognosis, review of lab and test results, benefits of treatment as well as management of disease, counseling of patient and coordination of care between various health care providers . Greater than half of the time spent - 35 minutes was used for coordination of care and counseling of patient.    Disclaimer: This note was prepared using voice recognition system and is likely to have sound alike errors and is not proof read.  Please call me with any questions.

## 2018-03-14 NOTE — PROGRESS NOTES
Administered 1 cc Betamethasone 6mg/cc  to right ventrogluteal. Pt tolerated well. No acute reaction noted to site. Pt instructed on S/S to report. Advised patient to wait in lobby 15 minutes after receiving injection to monitor for any reactions. Pt verbalized understanding.     Lot: [id number]  Exp: 07/19

## 2018-03-15 LAB
ALBUMIN SERPL ELPH-MCNC: 3.74 G/DL
ALPHA1 GLOB SERPL ELPH-MCNC: 0.39 G/DL
ALPHA2 GLOB SERPL ELPH-MCNC: 1.07 G/DL
ANA SER QL IF: NORMAL
B-GLOBULIN SERPL ELPH-MCNC: 1.23 G/DL
GAMMA GLOB SERPL ELPH-MCNC: 1.37 G/DL
PATHOLOGIST INTERPRETATION SPE: NORMAL
PROT SERPL-MCNC: 7.8 G/DL

## 2018-03-16 LAB
INTERPRETATION SERPL IFE-IMP: NORMAL
PATHOLOGIST INTERPRETATION IFE: NORMAL

## 2018-03-19 DIAGNOSIS — M35.3 POLYMYALGIA RHEUMATICA: Primary | ICD-10-CM

## 2018-03-19 LAB
ANTI-SSA ANTIBODY: 0.87 EU
ANTI-SSA INTERPRETATION: NEGATIVE
ANTI-SSB ANTIBODY: 0 EU
ANTI-SSB INTERPRETATION: NEGATIVE

## 2018-03-19 RX ORDER — PREDNISONE 10 MG/1
10 TABLET ORAL DAILY
Qty: 30 TABLET | Refills: 2 | Status: SHIPPED | OUTPATIENT
Start: 2018-03-19 | End: 2018-06-08 | Stop reason: SDUPTHER

## 2018-03-19 NOTE — PROGRESS NOTES
Polymyalgia rheumatica. Start prednisone 10 mg once daily because of diabetes. Check ESR/CRP again in 4 weeks. Patient advised over phone. Labs scheduled.

## 2018-05-02 ENCOUNTER — OFFICE VISIT (OUTPATIENT)
Dept: UROLOGY | Facility: CLINIC | Age: 78
End: 2018-05-02
Payer: MEDICARE

## 2018-05-02 VITALS
HEIGHT: 64 IN | BODY MASS INDEX: 24.88 KG/M2 | WEIGHT: 145.75 LBS | HEART RATE: 65 BPM | SYSTOLIC BLOOD PRESSURE: 124 MMHG | DIASTOLIC BLOOD PRESSURE: 62 MMHG

## 2018-05-02 DIAGNOSIS — R33.9 URINARY RETENTION: Primary | ICD-10-CM

## 2018-05-02 DIAGNOSIS — N32.81 OAB (OVERACTIVE BLADDER): ICD-10-CM

## 2018-05-02 LAB
BACTERIA #/AREA URNS AUTO: ABNORMAL /HPF
BILIRUB SERPL-MCNC: NORMAL MG/DL
BLOOD URINE, POC: NORMAL
COLOR, POC UA: YELLOW
GLUCOSE UR QL STRIP: 250
KETONES UR QL STRIP: NORMAL
LEUKOCYTE ESTERASE URINE, POC: NORMAL
MICROSCOPIC COMMENT: ABNORMAL
NITRITE, POC UA: NORMAL
PH, POC UA: 7
POC RESIDUAL URINE VOLUME: 106 ML (ref 0–100)
PROTEIN, POC: NORMAL
RBC #/AREA URNS AUTO: 28 /HPF (ref 0–4)
SPECIFIC GRAVITY, POC UA: 1
SQUAMOUS #/AREA URNS AUTO: 2 /HPF
UROBILINOGEN, POC UA: NORMAL
WBC #/AREA URNS AUTO: 2 /HPF (ref 0–5)
YEAST UR QL AUTO: ABNORMAL

## 2018-05-02 PROCEDURE — 87086 URINE CULTURE/COLONY COUNT: CPT

## 2018-05-02 PROCEDURE — 81002 URINALYSIS NONAUTO W/O SCOPE: CPT | Mod: S$GLB,,, | Performed by: UROLOGY

## 2018-05-02 PROCEDURE — 99204 OFFICE O/P NEW MOD 45 MIN: CPT | Mod: 25,S$GLB,, | Performed by: UROLOGY

## 2018-05-02 PROCEDURE — 3078F DIAST BP <80 MM HG: CPT | Mod: CPTII,S$GLB,, | Performed by: UROLOGY

## 2018-05-02 PROCEDURE — 81001 URINALYSIS AUTO W/SCOPE: CPT

## 2018-05-02 PROCEDURE — 99999 PR PBB SHADOW E&M-EST. PATIENT-LVL II: CPT | Mod: PBBFAC,,, | Performed by: UROLOGY

## 2018-05-02 PROCEDURE — 3074F SYST BP LT 130 MM HG: CPT | Mod: CPTII,S$GLB,, | Performed by: UROLOGY

## 2018-05-02 PROCEDURE — 51798 US URINE CAPACITY MEASURE: CPT | Mod: S$GLB,,, | Performed by: UROLOGY

## 2018-05-02 NOTE — PROGRESS NOTES
Chief Complaint: Incontinence    HPI:  78 yo woman had some incontinence and was given some med and it helped some.  Sudden urgency leaks on the way to the toilet.  All was fine until about a month ago.  Had a bladder suspension 2 years ago with Dr. Hicks (sounds like a mesh mid-urethral sling).  No abd/pelvic pain and no exac/rel factors.  No hematuria.  No recent urolithiasis.  Double voiding.  No UTI history. Normal Ct 11/15. Had her urine checked 3 months ago at Dr. Hicks' office and was treated for UTI.    Allergies:  Iodine and iodide containing products; Codeine; and Morphine    Medications: has a current medication list which includes the following prescription(s): alprazolam, atenolol, atorvastatin, diphenoxylate-atropine 2.5-0.025 mg, eliquis, gabapentin, glimepiride, hydrochlorothiazide, metformin, nitrofurantoin, prednisone, quinapril, and sertraline.    Review of Systems:  General: No fever, chills, fatigability, or weight loss.  Skin: No rashes, itching, or changes in color or texture of skin.  Chest: Denies BROOKE, cyanosis, wheezing, cough, and sputum production.  Abdomen: Appetite fine. No weight loss. Denies diarrhea, abdominal pain, hematemesis, or blood in stool.  Musculoskeletal: No joint stiffness or swelling. Denies back pain.  : As above.  All other review of systems negative.    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    PSH:   has a past surgical history that includes Hysterectomy; Cholecystectomy; Appendectomy; and Cardiac pacemaker placement.    FamHx: family history  includes Pneumonia in her father; Stroke in her mother.    SocHx:  reports that she has never smoked. She does not have any smokeless tobacco history on file. She reports that she does not drink alcohol or use drugs.     Physical Exam:  Vitals:   Vitals:    05/02/18 0953   BP: 124/62   Pulse: 65     General: A&Ox3. No apparent distress. No deformities.  Neck: No masses. Normal thyroid.  Lungs: normal inspiration. No use of accessory muscles.  Heart: normal pulse. No arrhythmias.  Abdomen: Soft. NT. ND. No masses. No hernias. No hepatosplenomegaly.  Lymphatic: Neck and groin nodes negative.  Skin: The skin is warm and dry. No jaundice.  Ext: No c/c/e.  : External genitalia normal.    Labs/Studies:   Urinalysis performed in clinic, summary: UA normal exc 250 glucose  Bladder Scan performed in office:  ml.    Impression/Plan:   1. Mild retention after mid-urethral sling.  Discussed role of caffeine in OAB.  Offered PFMT.  Anticholinergics should never be ordered for her.  Same goes for benadryl.  2. If has symptoms in future can return for cath UA/UCx.  Cath UA/UCx today to ensure no UTI.

## 2018-05-03 LAB — BACTERIA UR CULT: NO GROWTH

## 2018-06-08 DIAGNOSIS — M35.3 POLYMYALGIA RHEUMATICA: ICD-10-CM

## 2018-06-11 RX ORDER — PREDNISONE 10 MG/1
TABLET ORAL
Qty: 30 TABLET | Refills: 0 | Status: SHIPPED | OUTPATIENT
Start: 2018-06-11 | End: 2018-08-16 | Stop reason: SDUPTHER

## 2018-06-14 ENCOUNTER — OFFICE VISIT (OUTPATIENT)
Dept: RHEUMATOLOGY | Facility: CLINIC | Age: 78
End: 2018-06-14
Payer: MEDICARE

## 2018-06-14 ENCOUNTER — TELEPHONE (OUTPATIENT)
Dept: RHEUMATOLOGY | Facility: CLINIC | Age: 78
End: 2018-06-14

## 2018-06-14 ENCOUNTER — LAB VISIT (OUTPATIENT)
Dept: LAB | Facility: HOSPITAL | Age: 78
End: 2018-06-14
Attending: INTERNAL MEDICINE
Payer: MEDICARE

## 2018-06-14 VITALS
HEART RATE: 67 BPM | DIASTOLIC BLOOD PRESSURE: 62 MMHG | SYSTOLIC BLOOD PRESSURE: 148 MMHG | BODY MASS INDEX: 25.82 KG/M2 | WEIGHT: 151.25 LBS | HEIGHT: 64 IN

## 2018-06-14 DIAGNOSIS — M35.3 POLYMYALGIA RHEUMATICA: ICD-10-CM

## 2018-06-14 DIAGNOSIS — M35.3 POLYMYALGIA RHEUMATICA: Primary | ICD-10-CM

## 2018-06-14 PROBLEM — M25.50 POLYARTHRALGIA: Status: RESOLVED | Noted: 2018-03-14 | Resolved: 2018-06-14

## 2018-06-14 LAB
CRP SERPL-MCNC: 1.4 MG/L
ERYTHROCYTE [SEDIMENTATION RATE] IN BLOOD BY WESTERGREN METHOD: 37 MM/HR

## 2018-06-14 PROCEDURE — 86140 C-REACTIVE PROTEIN: CPT

## 2018-06-14 PROCEDURE — 3077F SYST BP >= 140 MM HG: CPT | Mod: CPTII,S$GLB,, | Performed by: INTERNAL MEDICINE

## 2018-06-14 PROCEDURE — 3078F DIAST BP <80 MM HG: CPT | Mod: CPTII,S$GLB,, | Performed by: INTERNAL MEDICINE

## 2018-06-14 PROCEDURE — 85651 RBC SED RATE NONAUTOMATED: CPT | Mod: PO

## 2018-06-14 PROCEDURE — 99213 OFFICE O/P EST LOW 20 MIN: CPT | Mod: S$GLB,,, | Performed by: INTERNAL MEDICINE

## 2018-06-14 PROCEDURE — 99999 PR PBB SHADOW E&M-EST. PATIENT-LVL III: CPT | Mod: PBBFAC,,, | Performed by: INTERNAL MEDICINE

## 2018-06-14 PROCEDURE — 36415 COLL VENOUS BLD VENIPUNCTURE: CPT | Mod: PO

## 2018-06-14 RX ORDER — MIRABEGRON 25 MG/1
25 TABLET, FILM COATED, EXTENDED RELEASE ORAL NIGHTLY
Refills: 5 | COMMUNITY
Start: 2018-06-05

## 2018-06-14 NOTE — TELEPHONE ENCOUNTER
MD Claribel Moncada MA             Labs show significant improvement. Please advice to cut down prednisone dose from 10 mg once daily to 5 mg once daily ( half a tablet ) daily. Thanks.

## 2018-06-14 NOTE — TELEPHONE ENCOUNTER
Contacted Pt informed of lab results and instructed Pt to decrease the prednisone from 10mg to 5mg (take half a tablet) Pt understood instructions       Per

## 2018-06-14 NOTE — ASSESSMENT & PLAN NOTE
Polymyalgia rheumatica on 10 mg daily prednisone since 4 weeks.  Doing well.  Recheck inflammatory markers today.  No associated symptoms of GCA.  On lower initial dose of  prednisone because of her diabetes.

## 2018-06-14 NOTE — PROGRESS NOTES
RHEUMATOLOGY CLINIC FOLLOW UP VISIT  Chief complaints:-  Follow-up for polymyalgia rheumatica.    HPI:-  Tatiana Suarez a 77 y.o. pleasant female comes in for a follow up visit with above chief complaints.  She follows up for polymyalgia rheumatica.  She reports doing well today with morning stiffness or large joints lasting for less than 15 min and denies any joint pain today.  She has some joint pain usually at the end of the day after prolonged activities but in knowing density compared to the severe pain she had before starting prednisone.  No associated symptoms of headache, visual blurring.    Review of Systems   Constitutional: Negative for chills and fever.   HENT: Negative for congestion and sore throat.    Eyes: Negative for blurred vision and redness.   Respiratory: Negative for cough and shortness of breath.    Cardiovascular: Negative for chest pain and leg swelling.   Gastrointestinal: Negative for abdominal pain.   Genitourinary: Negative for dysuria.   Musculoskeletal: Positive for back pain, joint pain, myalgias and neck pain. Negative for falls.   Skin: Negative for rash.   Neurological: Negative for headaches.   Endo/Heme/Allergies: Does not bruise/bleed easily.   Psychiatric/Behavioral: Negative for memory loss. The patient does not have insomnia.        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    Carotid artery syndrome 7/5/2016    Chronic back pain     Essential (primary) hypertension     Hyperlipemia 7/18/2016    Hypertension     Lacunar stroke of right subthalamic region     OSVALDO (obstructive sleep apnea) 7/18/2016    Paroxysmal atrial fibrillation 7/4/2016    Transient cerebral ischemia 7/4/2016    Type 2 diabetes mellitus without complication        Past Surgical History:   Procedure Laterality Date    APPENDECTOMY      CARDIAC PACEMAKER  "PLACEMENT      CHOLECYSTECTOMY      HYSTERECTOMY      mid-urethral sling          Social History   Substance Use Topics    Smoking status: Never Smoker    Smokeless tobacco: Not on file    Alcohol use No       Family History   Problem Relation Age of Onset    Stroke Mother     Pneumonia Father        Review of patient's allergies indicates:   Allergen Reactions    Iodine and iodide containing products Anaphylaxis and Rash    Codeine Itching    Morphine Itching           Physical examination:-    Vitals:    06/14/18 1145   BP: (!) 148/62   Pulse: 67   Weight: 68.6 kg (151 lb 3.8 oz)   Height: 5' 4" (1.626 m)   PainSc: 0-No pain       Physical Exam   Constitutional: She is oriented to person, place, and time and well-developed, well-nourished, and in no distress. No distress.   HENT:   Head: Normocephalic.   Mouth/Throat: Oropharynx is clear and moist.   Eyes: Conjunctivae and EOM are normal. Pupils are equal, round, and reactive to light.   Neck: Normal range of motion. Neck supple.   Cardiovascular: Normal rate and intact distal pulses.    Pulmonary/Chest: Effort normal. No respiratory distress.   Abdominal: Soft. There is no tenderness.   Musculoskeletal:   No synovitis over small joints of hands or feet.  No effusion over large joints.   Neurological: She is alert and oriented to person, place, and time. No cranial nerve deficit.   Skin: Skin is warm. No rash noted. No erythema.   Psychiatric: Mood and affect normal.   Nursing note and vitals reviewed.      Medication List with Changes/Refills   Current Medications    ALPRAZOLAM (XANAX) 0.5 MG TABLET    Take 0.5 mg by mouth every evening.     ATENOLOL (TENORMIN) 50 MG TABLET    TAKE 1/2 TABLET BY MOUTH ONCE DAILY THANK YOU    ATORVASTATIN (LIPITOR) 10 MG TABLET    Take 10 mg by mouth once daily.    DIPHENOXYLATE-ATROPINE 2.5-0.025 MG (LOMOTIL) 2.5-0.025 MG PER TABLET    TAKE ONE TABLET BY MOUTH ONCE DAILY AS NEEDED THANK YOU    ELIQUIS 5 MG TAB    TAKE " ONE TABLET BY MOUTH TWICE DAILY **THANK YOU**    GABAPENTIN (NEURONTIN) 100 MG CAPSULE    Take 100 mg by mouth 2 (two) times daily. Takes one tablet by mouth in the morning and two tablets at night    GLIMEPIRIDE (AMARYL) 1 MG TABLET    Take 1 mg by mouth Daily.    HYDROCHLOROTHIAZIDE (HYDRODIURIL) 25 MG TABLET    Take 25 mg by mouth once daily.    METFORMIN (GLUCOPHAGE-XR) 500 MG 24 HR TABLET    TAKE ONE TABLET BY MOUTH TWICE DAILY THANK YOU    MYRBETRIQ 25 MG TB24 ER TABLET    Take 25 mg by mouth nightly. at bedtime.    NITROFURANTOIN (MACRODANTIN) 50 MG CAPSULE    TAKE ONE CAPSULE BY MOUTH AT BEDTIME THANK YOU    PREDNISONE (DELTASONE) 10 MG TABLET    TAKE ONE TABLET BY MOUTH ONCE DAILY **THANK YOU**    QUINAPRIL (ACCUPRIL) 40 MG TABLET    Take 40 mg by mouth 2 (two) times daily.    SERTRALINE (ZOLOFT) 25 MG TABLET           Assessment/Plans:-  Polymyalgia rheumatica  Polymyalgia rheumatica on 10 mg daily prednisone since 4 weeks.  Doing well.  Recheck inflammatory markers today.  No associated symptoms of GCA.  On lower initial dose of  prednisone because of her diabetes.  # Follow-up in about 3 months (around 9/14/2018).    Disclaimer: This note was prepared using voice recognition system and is likely to have sound alike errors and is not proof read.  Please call me with any questions.

## 2018-07-25 RX ORDER — APIXABAN 5 MG/1
TABLET, FILM COATED ORAL
Qty: 60 TABLET | Refills: 5 | Status: SHIPPED | OUTPATIENT
Start: 2018-07-25 | End: 2019-03-05 | Stop reason: SDUPTHER

## 2018-08-16 DIAGNOSIS — M35.3 POLYMYALGIA RHEUMATICA: ICD-10-CM

## 2018-08-16 RX ORDER — PREDNISONE 10 MG/1
TABLET ORAL
Qty: 30 TABLET | Refills: 0 | Status: SHIPPED | OUTPATIENT
Start: 2018-08-16 | End: 2018-09-08 | Stop reason: SDUPTHER

## 2018-09-08 DIAGNOSIS — M35.3 POLYMYALGIA RHEUMATICA: ICD-10-CM

## 2018-09-10 RX ORDER — PREDNISONE 10 MG/1
TABLET ORAL
Qty: 30 TABLET | Refills: 0 | Status: SHIPPED | OUTPATIENT
Start: 2018-09-10 | End: 2018-10-13 | Stop reason: SDUPTHER

## 2018-09-19 ENCOUNTER — CLINICAL SUPPORT (OUTPATIENT)
Dept: CARDIOLOGY | Facility: CLINIC | Age: 78
End: 2018-09-19
Attending: INTERNAL MEDICINE
Payer: MEDICARE

## 2018-09-19 DIAGNOSIS — Z95.0 CARDIAC PACEMAKER IN SITU: ICD-10-CM

## 2018-09-19 DIAGNOSIS — I49.5 SINUS NODE DYSFUNCTION: ICD-10-CM

## 2018-09-19 PROCEDURE — 93280 PM DEVICE PROGR EVAL DUAL: CPT | Mod: PBBFAC | Performed by: INTERNAL MEDICINE

## 2018-10-13 DIAGNOSIS — M35.3 POLYMYALGIA RHEUMATICA: ICD-10-CM

## 2018-10-13 RX ORDER — PREDNISONE 10 MG/1
TABLET ORAL
Qty: 30 TABLET | Refills: 0 | Status: SHIPPED | OUTPATIENT
Start: 2018-10-13 | End: 2018-11-19 | Stop reason: SDUPTHER

## 2018-11-19 DIAGNOSIS — M35.3 POLYMYALGIA RHEUMATICA: ICD-10-CM

## 2018-11-19 RX ORDER — PREDNISONE 10 MG/1
TABLET ORAL
Qty: 30 TABLET | Refills: 0 | Status: SHIPPED | OUTPATIENT
Start: 2018-11-19 | End: 2018-12-13 | Stop reason: SDUPTHER

## 2018-12-13 DIAGNOSIS — M35.3 POLYMYALGIA RHEUMATICA: ICD-10-CM

## 2018-12-13 RX ORDER — PREDNISONE 10 MG/1
TABLET ORAL
Qty: 30 TABLET | Refills: 0 | Status: SHIPPED | OUTPATIENT
Start: 2018-12-13 | End: 2019-01-14 | Stop reason: SDUPTHER

## 2019-01-14 DIAGNOSIS — M35.3 POLYMYALGIA RHEUMATICA: ICD-10-CM

## 2019-01-14 RX ORDER — PREDNISONE 10 MG/1
TABLET ORAL
Qty: 30 TABLET | Refills: 0 | Status: SHIPPED | OUTPATIENT
Start: 2019-01-14 | End: 2019-02-18 | Stop reason: SDUPTHER

## 2019-02-18 DIAGNOSIS — M35.3 POLYMYALGIA RHEUMATICA: ICD-10-CM

## 2019-02-18 RX ORDER — PREDNISONE 5 MG/1
7.5 TABLET ORAL DAILY
Qty: 45 TABLET | Refills: 1 | Status: SHIPPED | OUTPATIENT
Start: 2019-02-18

## 2019-02-25 ENCOUNTER — HOSPITAL ENCOUNTER (EMERGENCY)
Facility: HOSPITAL | Age: 79
Discharge: HOME | End: 2019-02-25
Attending: EMERGENCY MEDICINE
Payer: MEDICARE

## 2019-02-25 VITALS
RESPIRATION RATE: 16 BRPM | HEIGHT: 64 IN | DIASTOLIC BLOOD PRESSURE: 58 MMHG | WEIGHT: 162 LBS | OXYGEN SATURATION: 97 % | BODY MASS INDEX: 27.66 KG/M2 | TEMPERATURE: 98 F | SYSTOLIC BLOOD PRESSURE: 126 MMHG | HEART RATE: 86 BPM

## 2019-02-25 DIAGNOSIS — R19.7 DIARRHEA: ICD-10-CM

## 2019-02-25 DIAGNOSIS — N39.0 URINARY TRACT INFECTION WITHOUT HEMATURIA, SITE UNSPECIFIED: ICD-10-CM

## 2019-02-25 DIAGNOSIS — K76.0 FATTY LIVER: Primary | ICD-10-CM

## 2019-02-25 DIAGNOSIS — N28.1 RENAL CYST, RIGHT: ICD-10-CM

## 2019-02-25 LAB
ALBUMIN SERPL BCP-MCNC: 2.9 G/DL
ALP SERPL-CCNC: 57 U/L
ALT SERPL W/O P-5'-P-CCNC: 16 U/L
ANION GAP SERPL CALC-SCNC: 9 MMOL/L
AST SERPL-CCNC: 20 U/L
BACTERIA #/AREA URNS HPF: ABNORMAL /HPF
BASOPHILS # BLD AUTO: 0.04 K/UL
BASOPHILS NFR BLD: 0.4 %
BILIRUB SERPL-MCNC: 0.4 MG/DL
BILIRUB UR QL STRIP: NEGATIVE
BNP SERPL-MCNC: 103 PG/ML
BUN SERPL-MCNC: 25 MG/DL
CALCIUM SERPL-MCNC: 9.2 MG/DL
CHLORIDE SERPL-SCNC: 103 MMOL/L
CLARITY UR: CLEAR
CO2 SERPL-SCNC: 27 MMOL/L
COLOR UR: YELLOW
CREAT SERPL-MCNC: 1.3 MG/DL
DACRYOCYTES BLD QL SMEAR: ABNORMAL
DIFFERENTIAL METHOD: ABNORMAL
EOSINOPHIL # BLD AUTO: 0.3 K/UL
EOSINOPHIL NFR BLD: 2.4 %
ERYTHROCYTE [DISTWIDTH] IN BLOOD BY AUTOMATED COUNT: 14.5 %
EST. GFR  (AFRICAN AMERICAN): 45 ML/MIN/1.73 M^2
EST. GFR  (NON AFRICAN AMERICAN): 39 ML/MIN/1.73 M^2
GLUCOSE SERPL-MCNC: 206 MG/DL
GLUCOSE UR QL STRIP: ABNORMAL
HCT VFR BLD AUTO: 41.6 %
HGB BLD-MCNC: 13.5 G/DL
HGB UR QL STRIP: NEGATIVE
INFLUENZA A, MOLECULAR: NEGATIVE
INFLUENZA B, MOLECULAR: NEGATIVE
INR PPP: 0.9
KETONES UR QL STRIP: NEGATIVE
LACTATE SERPL-SCNC: 1 MMOL/L
LACTATE SERPL-SCNC: 2.3 MMOL/L
LEUKOCYTE ESTERASE UR QL STRIP: ABNORMAL
LYMPHOCYTES # BLD AUTO: 1.8 K/UL
LYMPHOCYTES NFR BLD: 16.7 %
MAGNESIUM SERPL-MCNC: 1.7 MG/DL
MCH RBC QN AUTO: 30.8 PG
MCHC RBC AUTO-ENTMCNC: 32.5 G/DL
MCV RBC AUTO: 95 FL
MICROSCOPIC COMMENT: ABNORMAL
MONOCYTES # BLD AUTO: 0.9 K/UL
MONOCYTES NFR BLD: 8.6 %
NEUTROPHILS # BLD AUTO: 7.6 K/UL
NEUTROPHILS NFR BLD: 73.6 %
NITRITE UR QL STRIP: POSITIVE
OVALOCYTES BLD QL SMEAR: ABNORMAL
PH UR STRIP: 6 [PH] (ref 5–8)
PHOSPHATE SERPL-MCNC: 2.5 MG/DL
PLATELET # BLD AUTO: 221 K/UL
PMV BLD AUTO: 12.1 FL
POIKILOCYTOSIS BLD QL SMEAR: SLIGHT
POTASSIUM SERPL-SCNC: 3.9 MMOL/L
PROCALCITONIN SERPL IA-MCNC: 0.1 NG/ML
PROT SERPL-MCNC: 6.2 G/DL
PROT UR QL STRIP: NEGATIVE
PROTHROMBIN TIME: 10.3 SEC
RBC # BLD AUTO: 4.38 M/UL
RBC #/AREA URNS HPF: 4 /HPF (ref 0–4)
SODIUM SERPL-SCNC: 139 MMOL/L
SP GR UR STRIP: 1.02 (ref 1–1.03)
SPECIMEN SOURCE: NORMAL
SQUAMOUS #/AREA URNS HPF: 0 /HPF
TROPONIN I SERPL DL<=0.01 NG/ML-MCNC: 0.03 NG/ML
TROPONIN I SERPL DL<=0.01 NG/ML-MCNC: 0.03 NG/ML
URN SPEC COLLECT METH UR: ABNORMAL
UROBILINOGEN UR STRIP-ACNC: NEGATIVE EU/DL
WBC # BLD AUTO: 10.58 K/UL
WBC #/AREA URNS HPF: >100 /HPF (ref 0–5)
YEAST URNS QL MICRO: ABNORMAL

## 2019-02-25 PROCEDURE — 36415 COLL VENOUS BLD VENIPUNCTURE: CPT

## 2019-02-25 PROCEDURE — 63600175 PHARM REV CODE 636 W HCPCS: Performed by: EMERGENCY MEDICINE

## 2019-02-25 PROCEDURE — 87088 URINE BACTERIA CULTURE: CPT

## 2019-02-25 PROCEDURE — 85610 PROTHROMBIN TIME: CPT

## 2019-02-25 PROCEDURE — 87086 URINE CULTURE/COLONY COUNT: CPT

## 2019-02-25 PROCEDURE — 99285 EMERGENCY DEPT VISIT HI MDM: CPT | Mod: 25

## 2019-02-25 PROCEDURE — 96361 HYDRATE IV INFUSION ADD-ON: CPT

## 2019-02-25 PROCEDURE — 84100 ASSAY OF PHOSPHORUS: CPT

## 2019-02-25 PROCEDURE — 87040 BLOOD CULTURE FOR BACTERIA: CPT

## 2019-02-25 PROCEDURE — 83880 ASSAY OF NATRIURETIC PEPTIDE: CPT

## 2019-02-25 PROCEDURE — 83735 ASSAY OF MAGNESIUM: CPT

## 2019-02-25 PROCEDURE — 80053 COMPREHEN METABOLIC PANEL: CPT

## 2019-02-25 PROCEDURE — 25000003 PHARM REV CODE 250: Performed by: EMERGENCY MEDICINE

## 2019-02-25 PROCEDURE — 87502 INFLUENZA DNA AMP PROBE: CPT

## 2019-02-25 PROCEDURE — 93005 ELECTROCARDIOGRAM TRACING: CPT

## 2019-02-25 PROCEDURE — 96365 THER/PROPH/DIAG IV INF INIT: CPT

## 2019-02-25 PROCEDURE — 81000 URINALYSIS NONAUTO W/SCOPE: CPT

## 2019-02-25 PROCEDURE — 83605 ASSAY OF LACTIC ACID: CPT

## 2019-02-25 PROCEDURE — 93010 EKG 12-LEAD: ICD-10-PCS | Mod: ,,, | Performed by: INTERNAL MEDICINE

## 2019-02-25 PROCEDURE — 93010 ELECTROCARDIOGRAM REPORT: CPT | Mod: ,,, | Performed by: INTERNAL MEDICINE

## 2019-02-25 PROCEDURE — 84484 ASSAY OF TROPONIN QUANT: CPT | Mod: 91

## 2019-02-25 PROCEDURE — 84145 PROCALCITONIN (PCT): CPT

## 2019-02-25 PROCEDURE — 85025 COMPLETE CBC W/AUTO DIFF WBC: CPT

## 2019-02-25 RX ORDER — LEVOFLOXACIN 5 MG/ML
750 INJECTION, SOLUTION INTRAVENOUS
Status: COMPLETED | OUTPATIENT
Start: 2019-02-25 | End: 2019-02-25

## 2019-02-25 RX ORDER — ACETAMINOPHEN 500 MG
1000 TABLET ORAL
Status: COMPLETED | OUTPATIENT
Start: 2019-02-25 | End: 2019-02-25

## 2019-02-25 RX ORDER — LEVOFLOXACIN 500 MG/1
500 TABLET, FILM COATED ORAL DAILY
Qty: 5 TABLET | Refills: 0 | Status: SHIPPED | OUTPATIENT
Start: 2019-02-25 | End: 2019-03-07

## 2019-02-25 RX ADMIN — SODIUM CHLORIDE 2205 ML: 0.9 INJECTION, SOLUTION INTRAVENOUS at 02:02

## 2019-02-25 RX ADMIN — ACETAMINOPHEN 1000 MG: 500 TABLET ORAL at 05:02

## 2019-02-25 RX ADMIN — LEVOFLOXACIN 750 MG: 750 INJECTION, SOLUTION INTRAVENOUS at 07:02

## 2019-02-25 NOTE — ED NOTES
Pt reminded to keep her arm straight while fluids infusing. Pt stated she would try. Fluids infusing per pump.

## 2019-02-25 NOTE — ED PROVIDER NOTES
"SCRIBE #1 NOTE: I, Heydi Edward, am scribing for, and in the presence of, Aditya Grover Jr., MD. I have scribed the entire note.      History      Chief Complaint   Patient presents with    Diarrhea     uncontrollable diarrhea x 2 weeks was on antibiotics 2 weeks ago for uti       Review of patient's allergies indicates:   Allergen Reactions    Iodine and iodide containing products Anaphylaxis and Rash    Codeine Itching    Morphine Itching        HPI   HPI    2/25/2019, 1:54 PM   History obtained from the patient and family      History of Present Illness: Tatiana Chaparro is a 78 y.o. female patient with a PMHx of DM2, TIA< Afib, OSVALDO, HTN, HLD who presents to the Emergency Department for chronic diarrhea which worsened after pt completed a course of cipro for a UTI 2 weeks ago. Family reports pt has had diarrhea for "awhile" but since she finished the cipro, pt has been having difficulty controlling her diarrhea, she reports she is not able to make it to the bathroom in time Symptoms are episodic and moderate in severity. No mitigating or exacerbating factors reported. Family reports pt has also been having more frequent falls the past few weeks, they state pt is loosing her balance. Pt has no complaints of pain at this time. Patient/family denies any fever, chills, n/v, abd pain, CP, SOB, head trauma, LOC, arthralgias, back pain, neck pain, urinary sxs, and all other sxs at this time. No further complaints or concerns at this time.       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    Carotid artery syndrome 7/5/2016    Chronic back pain     Essential (primary) hypertension     Hyperlipemia 7/18/2016    Hypertension     Lacunar stroke of right subthalamic region     OSVALDO (obstructive sleep apnea) 7/18/2016    Paroxysmal atrial " fibrillation 7/4/2016    Transient cerebral ischemia 7/4/2016    Type 2 diabetes mellitus without complication        Past Surgical History:  Past Surgical History:   Procedure Laterality Date    APPENDECTOMY      CARDIAC PACEMAKER PLACEMENT      CHOLECYSTECTOMY      HYSTERECTOMY      mid-urethral sling      REPLACEMENT-GENERATOR-PACEMAKER Left 4/27/2017    Performed by Kendrick Dias MD at [facility]         Family History:  Family History   Problem Relation Age of Onset    Stroke Mother     Pneumonia Father        Social History:  Social History     Tobacco Use    Smoking status: Never Smoker   Substance and Sexual Activity    Alcohol use: No    Drug use: No    Sexual activity: No       ROS   Review of Systems   Constitutional: Negative for chills and fever.   HENT: Negative for sore throat.    Respiratory: Negative for shortness of breath.    Cardiovascular: Negative for chest pain.   Gastrointestinal: Positive for diarrhea. Negative for abdominal pain, nausea and vomiting.   Genitourinary: Negative for dysuria, frequency and hematuria.   Musculoskeletal: Negative for arthralgias, back pain and neck pain.   Skin: Negative for rash.   Neurological: Negative for weakness, numbness and headaches.   Hematological: Does not bruise/bleed easily.   All other systems reviewed and are negative.      Physical Exam      Initial Vitals [02/25/19 1248]   BP Pulse Resp Temp SpO2   117/63 73 18 98 °F (36.7 °C) 96 %      MAP       --          Physical Exam  Nursing Notes and Vital Signs Reviewed.  Constitutional: Patient is in no acute distress. Well-developed and well-nourished.  Head: Atraumatic. Normocephalic.  Eyes: PERRL. EOM intact. Conjunctivae are not pale. No scleral icterus.  ENT: Mucous membranes are moist. Oropharynx is clear and symmetric.    Neck: Supple. Full ROM. No lymphadenopathy. No midline bony tenderness of C-spine.   Cardiovascular: Regular rate. Regular rhythm. No murmurs, rubs, or  "gallops. Distal pulses are 2+ and symmetric.  Pulmonary/Chest: No respiratory distress. Clear to auscultation bilaterally. No wheezing or rales.  Abdominal: Soft and non-distended.  There is no tenderness.  No rebound, guarding, or rigidity. Good bowel sounds.  Genitourinary: No CVA tenderness  Musculoskeletal: Moves all extremities. No obvious deformities. No edema. No calf tenderness. No midline bony tenderness of T-spine or L-spine.   Skin: Warm and dry.  Neurological:  Alert, awake, and appropriate.  Normal speech.  No acute focal neurological deficits are appreciated.  Psychiatric: Normal affect. Good eye contact. Appropriate in content.    ED Course    Procedures  ED Vital Signs:  Vitals:    02/25/19 1248 02/25/19 1423 02/25/19 1542 02/25/19 1544   BP: 117/63  (!) 152/68    Pulse: 73 79 85 84   Resp: 18 20  20   Temp: 98 °F (36.7 °C)      TempSrc: Oral      SpO2: 96% 97% 96% 99%   Weight: 73.5 kg (162 lb)      Height: 5' 4" (1.626 m)       02/25/19 1642 02/25/19 1841 02/25/19 1910   BP: (!) 155/65 (!) 141/66 (!) 129/58   Pulse: 79 84 81   Resp: 17 (!) 26 18   Temp:      TempSrc:      SpO2: 99% 97% 96%   Weight:      Height:          Abnormal Lab Results:  Labs Reviewed   CBC W/ AUTO DIFFERENTIAL - Abnormal; Notable for the following components:       Result Value    Gran% 73.6 (*)     Lymph% 16.7 (*)     All other components within normal limits   LACTIC ACID, PLASMA - Abnormal; Notable for the following components:    Lactate (Lactic Acid) 2.3 (*)     All other components within normal limits   URINALYSIS, REFLEX TO URINE CULTURE - Abnormal; Notable for the following components:    Glucose, UA 3+ (*)     Nitrite, UA Positive (*)     Leukocytes, UA 1+ (*)     All other components within normal limits    Narrative:     Preferred Collection Type->Urine, Clean Catch   B-TYPE NATRIURETIC PEPTIDE - Abnormal; Notable for the following components:     (*)     All other components within normal limits "   PHOSPHORUS - Abnormal; Notable for the following components:    Phosphorus 2.5 (*)     All other components within normal limits   COMPREHENSIVE METABOLIC PANEL - Abnormal; Notable for the following components:    Glucose 206 (*)     BUN, Bld 25 (*)     Albumin 2.9 (*)     eGFR if  45 (*)     eGFR if non  39 (*)     All other components within normal limits   TROPONIN I - Abnormal; Notable for the following components:    Troponin I 0.028 (*)     All other components within normal limits   TROPONIN I - Abnormal; Notable for the following components:    Troponin I 0.032 (*)     All other components within normal limits   URINALYSIS MICROSCOPIC - Abnormal; Notable for the following components:    WBC, UA >100 (*)     Bacteria, UA Moderate (*)     Yeast, UA Moderate (*)     All other components within normal limits    Narrative:     Preferred Collection Type->Urine, Clean Catch   INFLUENZA A & B BY MOLECULAR   CULTURE, BLOOD   CULTURE, BLOOD   CULTURE, STOOL   CLOSTRIDIUM DIFFICILE   CULTURE, URINE   PROTIME-INR   PROCALCITONIN   MAGNESIUM   LACTIC ACID, PLASMA   STOOL EXAM-OVA,CYSTS,PARASITES   WBC, STOOL        All Lab Results:  Results for orders placed or performed during the hospital encounter of 02/25/19   Influenza A & B by Molecular   Result Value Ref Range    Influenza A, Molecular Negative Negative    Influenza B, Molecular Negative Negative    Flu A & B Source Nasal swab    CBC auto differential   Result Value Ref Range    WBC 10.58 3.90 - 12.70 K/uL    RBC 4.38 4.00 - 5.40 M/uL    Hemoglobin 13.5 12.0 - 16.0 g/dL    Hematocrit 41.6 37.0 - 48.5 %    MCV 95 82 - 98 fL    MCH 30.8 27.0 - 31.0 pg    MCHC 32.5 32.0 - 36.0 g/dL    RDW 14.5 11.5 - 14.5 %    Platelets 221 150 - 350 K/uL    MPV 12.1 9.2 - 12.9 fL    Gran # (ANC) 7.6 1.8 - 7.7 K/uL    Lymph # 1.8 1.0 - 4.8 K/uL    Mono # 0.9 0.3 - 1.0 K/uL    Eos # 0.3 0.0 - 0.5 K/uL    Baso # 0.04 0.00 - 0.20 K/uL    Gran% 73.6 (H)  38.0 - 73.0 %    Lymph% 16.7 (L) 18.0 - 48.0 %    Mono% 8.6 4.0 - 15.0 %    Eosinophil% 2.4 0.0 - 8.0 %    Basophil% 0.4 0.0 - 1.9 %    Poik Slight     Ovalocytes Occasional     Tear Drop Cells Occasional     Differential Method Automated    Lactic acid, plasma #1   Result Value Ref Range    Lactate (Lactic Acid) 2.3 (H) 0.5 - 2.2 mmol/L   Urinalysis, Reflex to Urine Culture Urine, Clean Catch   Result Value Ref Range    Specimen UA Urine, Catheterized     Color, UA Yellow Yellow, Straw, Fatemeh    Appearance, UA Clear Clear    pH, UA 6.0 5.0 - 8.0    Specific Gravity, UA 1.020 1.005 - 1.030    Protein, UA Negative Negative    Glucose, UA 3+ (A) Negative    Ketones, UA Negative Negative    Bilirubin (UA) Negative Negative    Occult Blood UA Negative Negative    Nitrite, UA Positive (A) Negative    Urobilinogen, UA Negative <2.0 EU/dL    Leukocytes, UA 1+ (A) Negative   Protime-INR   Result Value Ref Range    Prothrombin Time 10.3 9.0 - 12.5 sec    INR 0.9 0.8 - 1.2   Brain natriuretic peptide   Result Value Ref Range     (H) 0 - 99 pg/mL   Procalcitonin   Result Value Ref Range    Procalcitonin 0.10 <0.25 ng/mL   Magnesium   Result Value Ref Range    Magnesium 1.7 1.6 - 2.6 mg/dL   Phosphorus   Result Value Ref Range    Phosphorus 2.5 (L) 2.7 - 4.5 mg/dL   Comprehensive metabolic panel   Result Value Ref Range    Sodium 139 136 - 145 mmol/L    Potassium 3.9 3.5 - 5.1 mmol/L    Chloride 103 95 - 110 mmol/L    CO2 27 23 - 29 mmol/L    Glucose 206 (H) 70 - 110 mg/dL    BUN, Bld 25 (H) 8 - 23 mg/dL    Creatinine 1.3 0.5 - 1.4 mg/dL    Calcium 9.2 8.7 - 10.5 mg/dL    Total Protein 6.2 6.0 - 8.4 g/dL    Albumin 2.9 (L) 3.5 - 5.2 g/dL    Total Bilirubin 0.4 0.1 - 1.0 mg/dL    Alkaline Phosphatase 57 55 - 135 U/L    AST 20 10 - 40 U/L    ALT 16 10 - 44 U/L    Anion Gap 9 8 - 16 mmol/L    eGFR if African American 45 (A) >60 mL/min/1.73 m^2    eGFR if non African American 39 (A) >60 mL/min/1.73 m^2   Troponin I   Result  Value Ref Range    Troponin I 0.028 (H) 0.000 - 0.026 ng/mL   Lactic acid, plasma #2   Result Value Ref Range    Lactate (Lactic Acid) 1.0 0.5 - 2.2 mmol/L   Troponin I   Result Value Ref Range    Troponin I 0.032 (H) 0.000 - 0.026 ng/mL   Urinalysis Microscopic   Result Value Ref Range    RBC, UA 4 0 - 4 /hpf    WBC, UA >100 (H) 0 - 5 /hpf    Bacteria, UA Moderate (A) None-Occ /hpf    Yeast, UA Moderate (A) None    Squam Epithel, UA 0 /hpf    Microscopic Comment SEE COMMENT        Imaging Results:  Imaging Results          CT Abdomen Pelvis  Without Contrast (Final result)  Result time 02/25/19 15:48:20   Procedure changed from CT Abdomen Pelvis With Contrast     Final result by ERAN Grajeda Sr., MD (02/25/19 15:48:20)                 Impression:      1. The appendix is absent.  The rest of the gastrointestinal system is normal in appearance.  2. There is an age-indeterminate fracture of the left transverse process of L1.  3. There is a persistent neo shaped radiopaque object in the sacrum.  4. There is a mild amount of levoconvex curvature of the lumbar spine.  5. There is a 28 mm exophytic hypodense mass off of the lateral aspect of the midpole of the right kidney. This mass has a Hounsfield measurement of 5.  This is characteristic of a cyst.  6. There has been interval development of fatty infiltration scattered throughout the liver.  All CT scans at this facility use dose modulation, iterative reconstruction, and/or weight base dosing when appropriate to reduce radiation dose when appropriate to reduce radiation dose to as low as reasonably achievable.      Electronically signed by: Soy Grajeda MD  Date:    02/25/2019  Time:    15:48             Narrative:    EXAMINATION:  CT ABDOMEN PELVIS WITHOUT CONTRAST    CLINICAL HISTORY:  Nausea, vomiting, diarrhea;    TECHNIQUE:  Standard abdomen and pelvis CT protocol with IV contrast was performed    COMPARISON:  08/10/2015    FINDINGS:  Finding: The size of  the heart is within normal limits.  There are mild dependent atelectatic changes in both lungs.  There is a mild amount of scarring in the inferior aspect of the right middle lobe.  There is no pneumothorax or pleural effusion.    There has been interval development of fatty infiltration scattered throughout the liver.  The gallbladder is absent.  The pancreas and adrenals are normal in appearance.  There are punctate calcifications scattered throughout the spleen.  The left kidney is normal in appearance.  There is a 28 mm exophytic hypodense mass off of the lateral aspect of the midpole of the right kidney.  This mass has a Hounsfield measurement of 5.  There is a small right extrarenal pelvis.  The ureters and the urinary bladder are normal in appearance.  The uterus is absent.  The appendix is absent.  The rest of the gastrointestinal system is normal in appearance. There is no free fluid within the abdomen or pelvis. There is no pneumoperitoneum.  There is a moderate amount of atherosclerosis.  There is a mild amount of levoconvex curvature of the lumbar spine.  There is an age-indeterminate fracture of the left transverse process of L1.  There is a persistent neo shaped radiopaque object in the sacrum.                               X-Ray Chest AP Portable (Final result)  Result time 02/25/19 13:51:05    Final result by ERAN Grajeda Sr., MD (02/25/19 13:51:05)                 Impression:      1. The lungs are clear.  2. There is a 12 mm oval shaped calcific density projected lateral to the right humeral head.  This is characteristic of calcific tendinitis.  3. A cardiac pacemaker remains in place. The leads appear to be intact.  .      Electronically signed by: Soy Grajeda MD  Date:    02/25/2019  Time:    13:51             Narrative:    EXAMINATION:  XR CHEST AP PORTABLE    CLINICAL HISTORY:  Sepsis;    COMPARISON:  01/04/2018    FINDINGS:  A cardiac pacemaker remains in place.  The leads appear to be  intact.  The size of the heart is normal. The lungs are clear. There is no pneumothorax.  The costophrenic angles are sharp.  There is a healed fracture in the posterolateral aspect of the right 7th rib.  There is a 12 mm oval shaped calcific density projected lateral to the right humeral head.                               The EKG was ordered, reviewed, and independently interpreted by the ED provider.  Interpretation time: 1413  Rate: 39 BPM  Rhythm: electronic ventricular pacemaker  Interpretation: No acute ST changes. No STEMI.         The Emergency Provider reviewed the vital signs and test results, which are outlined above.    ED Discussion     5:51 PM: Discussed pt's persistent neo-shaped radiopaque object near sacrum found on CT. He states this has been present over at least 4 years in previous imaging and is unchanged. Will notify pt of findings.     8:01 PM: Reassessed pt at this time.  Pt states her condition has improved at this time. Discussed with pt all pertinent ED information and results. Discussed pt dx and plan of tx. Gave pt all f/u and return to the ED instructions. All questions and concerns were addressed at this time. Pt expresses understanding of information and instructions, and is comfortable with plan to discharge. Pt is stable for discharge.    I discussed with patient and/or family/caretaker that evaluation in the ED does not suggest any emergent or life threatening medical conditions requiring immediate intervention beyond what was provided in the ED, and I believe patient is safe for discharge.  Regardless, an unremarkable evaluation in the ED does not preclude the development or presence of a serious of life threatening condition. As such, patient was instructed to return immediately for any worsening or change in current symptoms.    ED Medication(s):  Medications   levoFLOXacin 750 mg/150 mL IVPB 750 mg (750 mg Intravenous New Bag 2/25/19 1958)   sodium chloride 0.9% bolus 2,205 mL  (0 mL/kg × 73.5 kg Intravenous Stopped 2/25/19 1730)   acetaminophen tablet 1,000 mg (1,000 mg Oral Given 2/25/19 1730)     New Prescriptions    LEVOFLOXACIN (LEVAQUIN) 500 MG TABLET    Take 1 tablet (500 mg total) by mouth once daily. for 10 days         Follow-up Information     Kendrick Buckner MD. Schedule an appointment as soon as possible for a visit in 1 week.    Specialty:  Internal Medicine  Contact information:  [address] [facility] [address] [phone]             [facility]    Specialty:  Emergency Medicine  Why:  As needed, If symptoms worsen  Contact information:  [address] [facility] [address] [phone]                   Medical Decision Making    Medical Decision Making:   Clinical Tests:   Lab Tests: Ordered and Reviewed  Radiological Study: Ordered and Reviewed  Medical Tests: Ordered and Reviewed           Scribe Attestation:   Scribe #1: I performed the above scribed service and the documentation accurately describes the services I performed. I attest to the accuracy of the note.    Attending:   Physician Attestation Statement for Scribe #1: I, Aditya Grover Jr., MD, personally performed the services described in this documentation, as scribed by Heydi Leon, in my presence, and it is both accurate and complete.          Clinical Impression       ICD-10-CM ICD-9-CM   1. Fatty liver K76.0 571.8   2. Diarrhea R19.7 787.91   3. Renal cyst, right N28.1 753.10   4. Urinary tract infection without hematuria, site unspecified N39.0 599.0       Disposition:   Disposition: Discharged  Condition: Stable         AMALIA Salazar  02/26/19 0802

## 2019-02-25 NOTE — ED NOTES
Pt placed on bedside commode to collect urine and/or stool sample. Pt was unable to urinate or give stool.

## 2019-02-26 NOTE — ED NOTES
Faxed Alice with [facility] patients information to resume home health w/skilled nursing and added aid.   Fax # [phone]

## 2019-02-26 NOTE — ED NOTES
Received report from KATEY Nash Pt. In NAD, VSS, RR equal and unlabored. Pt's bed is low, locked, and call light in reach. SR up X2. Will continue to monitor pt.

## 2019-02-26 NOTE — ED NOTES
In and out catheter performed. Hand hygiene and sterile technique performed. Urine returned with 1 attempt. Pt tolerated well.

## 2019-02-27 LAB — BACTERIA UR CULT: NORMAL

## 2019-03-02 LAB
BACTERIA BLD CULT: NORMAL
BACTERIA BLD CULT: NORMAL

## 2019-03-05 NOTE — TELEPHONE ENCOUNTER
----- Message from Vivien Cabrera sent at 3/5/2019 10:12 AM CST -----  Contact: son-quoc  Type:  RX Refill Request    Who Called: son  Refill or New Rx:refill  RX Name and Strength:eliquis-5mg  How is the patient currently taking it? (ex. 1XDay):2xday  Is this a 30 day or 90 day RX:30  Preferred Pharmacy with phone number:  [facility] [address] [id number] [address] [address]  Phone: [phone] Fax: [phone]  Local or Mail Order:local  Ordering Provider:river mckay  Would the patient rather a call back or a response via [facility]? Call back  Best Call Back Number:[phone]  Additional Information: none      Thanks,  Vivien Cabrera

## 2019-03-07 ENCOUNTER — TELEPHONE (OUTPATIENT)
Dept: CARDIOLOGY | Facility: CLINIC | Age: 79
End: 2019-03-07

## 2019-03-07 NOTE — TELEPHONE ENCOUNTER
Spoke with daughter Lyndsay and confirmed Eliquis at pharmacy.      ----- Message from Vivien Cabrera sent at 3/7/2019  4:26 PM CST -----  Contact: daughtersavannah  Type:  Needs Medical Advice    Who Called: pt  Symptoms (please be specific): n/a   How long has patient had these symptoms:  n/a  Pharmacy name and phone #:  n/a  Would the patient rather a call back or a response via [facility]? Call [facility]  Best Call Back Number:[phone]  Additional Information: requesting call back regarding status of rx eliquis for pt.    Thanks,  Vivien Cabrera

## 2019-03-20 ENCOUNTER — HOSPITAL ENCOUNTER (EMERGENCY)
Facility: HOSPITAL | Age: 79
Discharge: HOME | End: 2019-03-20
Attending: EMERGENCY MEDICINE
Payer: MEDICARE

## 2019-03-20 VITALS
WEIGHT: 162 LBS | OXYGEN SATURATION: 98 % | HEART RATE: 74 BPM | HEIGHT: 64 IN | TEMPERATURE: 98 F | DIASTOLIC BLOOD PRESSURE: 85 MMHG | BODY MASS INDEX: 27.66 KG/M2 | RESPIRATION RATE: 20 BRPM | SYSTOLIC BLOOD PRESSURE: 164 MMHG

## 2019-03-20 DIAGNOSIS — M25.552 LEFT HIP PAIN: Primary | ICD-10-CM
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
## 2019-03-20 PROCEDURE — 99284 EMERGENCY DEPT VISIT MOD MDM: CPT | Mod: 25

## 2019-03-20 RX ORDER — TRAMADOL HYDROCHLORIDE 50 MG/1
50 TABLET ORAL EVERY 6 HOURS PRN
Qty: 12 TABLET | Refills: 0 | Status: SHIPPED | OUTPATIENT
Start: 2019-03-20

## 2019-03-20 RX ORDER — HYDROCHLOROTHIAZIDE 25 MG/1
25 TABLET ORAL DAILY
Qty: 30 TABLET | Refills: 0 | Status: SHIPPED | OUTPATIENT
Start: 2019-03-20

## 2019-03-20 NOTE — ED PROVIDER NOTES
SCRIBE #1 NOTE: I, Daphne Smith/Dana Patel, am scribing for, and in the presence of, Latha Mann MD. I have scribed the entire note.      History      Chief Complaint   Patient presents with    Leg Pain     pt c/o L upper leg pain s/p fall at home 3 days ago; pt reports she has chronic pain in her L leg, it has been worsened by the fall       Review of patient's allergies indicates:   Allergen Reactions    Iodine and iodide containing products Anaphylaxis and Rash    Codeine Itching    Morphine Itching        HPI   HPI    3/20/2019, 10:06 AM   History obtained from the patient      History of Present Illness: Tatiana Chaparro is a 78 y.o. female patient with PMHx of HTN, HLD and DM who presents to the Emergency Department for chronic L hip pain which worsened 3 days ago after the patient fell. Pt states she tripped, lost her balance, and fell. Pt. reports that the pain starts at her L hip and radiates down to her L ankle. Pt. Also reported that she hit her head when she fell. Symptoms are constant and moderate in severity. No mitigating or exacerbating factors reported. No associated sxs included. Patient denies any n/v/d, dizziness, BUE pain, LOC, back pain, neck pain, abd pain, HA, CP, SOB, and all other sxs at this time. No Prior Tx includes. No further complaints or concerns at this time.     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    Carotid artery syndrome 7/5/2016    Chronic back pain     Essential (primary) hypertension     Hyperlipemia 7/18/2016    Hypertension     Lacunar stroke of right subthalamic region     OSVALDO (obstructive sleep apnea) 7/18/2016    Paroxysmal atrial fibrillation 7/4/2016    Transient cerebral ischemia 7/4/2016    Type 2 diabetes mellitus without complication        Past Surgical  History:  Past Surgical History:   Procedure Laterality Date    APPENDECTOMY      CARDIAC PACEMAKER PLACEMENT      CHOLECYSTECTOMY      HYSTERECTOMY      mid-urethral sling      REPLACEMENT-GENERATOR-PACEMAKER Left 4/27/2017    Performed by Kendrick Dias MD at [facility] CATH LAB         Family History:  Family History   Problem Relation Age of Onset    Stroke Mother     Pneumonia Father        Social History:  Social History     Tobacco Use    Smoking status: Never Smoker   Substance and Sexual Activity    Alcohol use: No    Drug use: No    Sexual activity: No       ROS   Review of Systems   Constitutional: Negative for fever.   HENT: Negative for sore throat.         (+) head trauma   Respiratory: Negative for shortness of breath.    Cardiovascular: Negative for chest pain.   Gastrointestinal: Negative for abdominal pain, diarrhea, nausea and vomiting.   Genitourinary: Negative for dysuria.   Musculoskeletal: Positive for arthralgias (L hip). Negative for back pain and neck pain.        (-) BUE pain   Skin: Negative for rash.   Neurological: Negative for dizziness, syncope, weakness and headaches.   Hematological: Does not bruise/bleed easily.   All other systems reviewed and are negative.    Physical Exam      Initial Vitals [03/20/19 0933]   BP Pulse Resp Temp SpO2   (!) 146/67 69 18 98 °F (36.7 °C) 96 %      MAP       --          Physical Exam  Nursing Notes and Vital Signs Reviewed.  Constitutional: Patient is in no acute distress. Awake and alert. Appropriate for age.   Head: Atraumatic. No facial instability or step-offs.   Eyes: PERRL. EOM normal. Conjunctivae normal.   HENT: Moist mucous membranes. No epistaxis. Patent airway.   Neck: No midline bony tenderness, deformities, or step-offs   Cardiovascular: Regular rate and rhythm. Heart sounds are normal. Intact distal pulses   Pulmonary/Chest: No respiratory distress. Breath sounds are normal. No decreased breath sounds. Chest wall is stable.  "  Abdominal: Soft and non-distended. Non-tender.   Back: No abrasions or ecchymosis. No midline bony tenderness to the T-spine or L-spine. No deformities or step-offs.   Musculoskeletal: Full range of motion in bilateral extremities. No obvious deformities.   LLE: no evident deformity in L hip. Full ROM. No contusions or hematomas palpated. No swelling or tenderness. ROM is normal. Cap refill distally is <2 seconds. DP and PT pulses are equal and 2+ bilaterally. No motor deficit. No distal sensory deficit  Skin: Normal color. No cyanosis. No lacerations. No abrasions   Neurological: Awake and alert. Appropriate for age. GCS 15. Normal speech. Motor strength is normal at 5/5 bilaterally. Non-focal neurological examination.      ED Course    Procedures  ED Vital Signs:  Vitals:    03/20/19 0933 03/20/19 1125   BP: (!) 146/67 (!) 164/85   Pulse: 69 74   Resp: 18 20   Temp: 98 °F (36.7 °C) 98.1 °F (36.7 °C)   TempSrc: Oral    SpO2: 96% 98%   Weight: 73.5 kg (162 lb)    Height: 5' 4" (1.626 m)        Abnormal Lab Results:  None    All Lab Results:  None    Imaging Results:  Imaging Results          X-Ray Hip 2 View Left (Final result)  Result time 03/20/19 10:46:37    Final result by ERAN Grajeda Sr., MD (03/20/19 10:46:37)                 Impression:      1. No fracture or dislocation  2. There are mild degenerative changes in the visualized portion of the lumbar spine.  3. There is a moderate amount of atherosclerosis.  4. Surgical changes      Electronically signed by: Soy Grajeda MD  Date:    03/20/2019  Time:    10:46             Narrative:    EXAMINATION:  XR HIP 2 VIEW LEFT    CLINICAL HISTORY:  Pain in left hip    COMPARISON:  None    FINDINGS:  There is no fracture. There is no dislocation.  There are ligamentous anchors projected over of the side of the pubic symphysis.  There is an 18 mm neo-shaped radiopaque object projected over the sacrum.  There are mild degenerative changes in the visualized " portion of the lumbar spine.  There is a moderate amount of atherosclerosis.                               CT Head Without Contrast (Final result)  Result time 03/20/19 10:52:19    Final result by ERAN Grajeda Sr., MD (03/20/19 10:52:19)                 Impression:      1. There is no calvarial fracture or intracranial hemorrhage.  2. There are moderate chronic appearing bilateral periventricular ischemic white matter changes. There is a mild amount of generalized cerebral and cerebellar atrophy. There is no evidence of an acute ischemic event.  3. There are small colleen bullosa in the middle nasal turbinates bilaterally.  All CT scans at this facility use dose modulation, iterative reconstruction, and/or weight base dosing when appropriate to reduce radiation dose when appropriate to reduce radiation dose to as low as reasonably achievable.      Electronically signed by: Soy Grajeda MD  Date:    03/20/2019  Time:    10:52             Narrative:    EXAMINATION:  CT HEAD WITHOUT CONTRAST    CLINICAL HISTORY:  fall;    TECHNIQUE:  Standard brain CT protocol without IV contrast was performed.    COMPARISON:  01/04/2018    FINDINGS:  There is no calvarial fracture or intracranial hemorrhage.  There are moderate chronic appearing bilateral periventricular ischemic white matter changes.  There is a mild amount of generalized cerebral and cerebellar atrophy.  There is no evidence of an acute ischemic event.  There is a mild amount of calcification in the basal ganglia bilaterally.  There are small colleen bullosa in the middle nasal turbinates bilaterally.  The visualized portion of the paranasal sinuses is clear.                                        The Emergency Provider reviewed the vital signs and test results, which are outlined above.    ED Discussion     11:13 AM: Reassessed pt at this time. Patient is awake, alert, and in NAD. Pt states her condition has improved at this time. Discussed with pt all  pertinent ED information and results. Discussed pt dx and plan of tx. Gave pt all f/u and return to the ED instructions. All questions and concerns were addressed at this time. Pt expresses understanding of information and instructions, and is comfortable with plan to discharge. Pt is stable for discharge.    Trauma precautions were discussed with patient and/or family/caretaker; I do not specifically detect any abdominal, thoracic, CNS, orthopedic, or other emergent or life threatening condition and that patient is safe to be discharged.  It was also discussed that despite an unrevealing examination and negative radiographic examination for serious or life threatening injury, these conditions may still exist.  As such, patient should return to ED immediately should they experience, severe or worsening pain, shortness of breath, abdominal pain, headache, vomiting, or any other concern.  It was also discussed that not infrequently, injuries may not be diagnosed during the initial ED visit (such as fractures) and that if the patient discovers a new area of concern, a new area of injury that was not evaluated in the ED, they should return for evaluation as they may have an injury that requires treatment.    ED Medication(s):  Medications - No data to display    Follow-up Information     Kendrick Buckner MD. Schedule an appointment as soon as possible for a visit in 1 day.    Specialty:  Internal Medicine  Why:  REturn to the Emergency Room, If symptoms worsen  [facility] information:  [address] [facility] [address]  [phone]                     Medical Decision Making    Medical Decision Making:   Clinical Tests:   Radiological Study: Ordered and Reviewed           Scribe Attestation:   Scribe #1: I performed the above scribed service and the documentation accurately describes the services I performed. I attest to the accuracy of the note.    Attending:   Physician Attestation  Statement for Scribe #1: I, Latha Mann MD, personally performed the services described in this documentation, as scribed by Daphne Smith/Dana Patel, in my presence, and it is both accurate and complete.          Clinical Impression       ICD-10-CM ICD-9-CM   1. Left hip pain M25.552 719.45   2. Fall in home, initial encounter W19.XXXA E888.9    Y92.009 E849.0       Disposition:   Disposition: Discharged  Condition: Stable         Latha Mann MD  03/20/19 0049

## 2019-03-21 ENCOUNTER — PES CALL (OUTPATIENT)
Dept: ADMINISTRATIVE | Facility: CLINIC | Age: 79
End: 2019-03-21

## 2019-03-25 ENCOUNTER — OFFICE VISIT (OUTPATIENT)
Dept: GASTROENTEROLOGY | Facility: CLINIC | Age: 79
End: 2019-03-25
Payer: MEDICARE

## 2019-03-25 ENCOUNTER — CLINICAL SUPPORT (OUTPATIENT)
Dept: CARDIOLOGY | Facility: CLINIC | Age: 79
End: 2019-03-25
Payer: MEDICARE

## 2019-03-25 VITALS
DIASTOLIC BLOOD PRESSURE: 80 MMHG | SYSTOLIC BLOOD PRESSURE: 126 MMHG | WEIGHT: 165 LBS | BODY MASS INDEX: 28.17 KG/M2 | HEIGHT: 64 IN

## 2019-03-25 DIAGNOSIS — K52.1 DRUG-INDUCED DIARRHEA: ICD-10-CM

## 2019-03-25 DIAGNOSIS — I49.5 SINUS NODE DYSFUNCTION: ICD-10-CM

## 2019-03-25 DIAGNOSIS — Z95.0 CARDIAC PACEMAKER IN SITU: ICD-10-CM

## 2019-03-25 DIAGNOSIS — I67.3 BINSWANGER'S DEMENTIA: Primary | ICD-10-CM

## 2019-03-25 PROCEDURE — 93280 PACEMAKER PROGRAMMING: ICD-10-PCS | Mod: S$GLB,,, | Performed by: INTERNAL MEDICINE

## 2019-03-25 PROCEDURE — 3079F PR MOST RECENT DIASTOLIC BLOOD PRESSURE 80-89 MM HG: ICD-10-PCS | Mod: CPTII,S$GLB,, | Performed by: INTERNAL MEDICINE

## 2019-03-25 PROCEDURE — 1101F PR PT FALLS ASSESS DOC 0-1 FALLS W/OUT INJ PAST YR: ICD-10-PCS | Mod: CPTII,S$GLB,, | Performed by: INTERNAL MEDICINE

## 2019-03-25 PROCEDURE — 99999 PR PBB SHADOW E&M-EST. PATIENT-LVL II: CPT | Mod: PBBFAC,,, | Performed by: INTERNAL MEDICINE

## 2019-03-25 PROCEDURE — 93280 PM DEVICE PROGR EVAL DUAL: CPT | Mod: S$GLB,,, | Performed by: INTERNAL MEDICINE

## 2019-03-25 PROCEDURE — 3079F DIAST BP 80-89 MM HG: CPT | Mod: CPTII,S$GLB,, | Performed by: INTERNAL MEDICINE

## 2019-03-25 PROCEDURE — 3074F PR MOST RECENT SYSTOLIC BLOOD PRESSURE < 130 MM HG: ICD-10-PCS | Mod: CPTII,S$GLB,, | Performed by: INTERNAL MEDICINE

## 2019-03-25 PROCEDURE — 99213 PR OFFICE/OUTPT VISIT, EST, LEVL III, 20-29 MIN: ICD-10-PCS | Mod: S$GLB,,, | Performed by: INTERNAL MEDICINE

## 2019-03-25 PROCEDURE — 3074F SYST BP LT 130 MM HG: CPT | Mod: CPTII,S$GLB,, | Performed by: INTERNAL MEDICINE

## 2019-03-25 PROCEDURE — 1101F PT FALLS ASSESS-DOCD LE1/YR: CPT | Mod: CPTII,S$GLB,, | Performed by: INTERNAL MEDICINE

## 2019-03-25 PROCEDURE — 99999 PR PBB SHADOW E&M-EST. PATIENT-LVL II: ICD-10-PCS | Mod: PBBFAC,,, | Performed by: INTERNAL MEDICINE

## 2019-03-25 PROCEDURE — 99213 OFFICE O/P EST LOW 20 MIN: CPT | Mod: S$GLB,,, | Performed by: INTERNAL MEDICINE

## 2019-03-25 NOTE — PROGRESS NOTES
Subjective:       Patient ID: Tatiana Chaparro is a 78 y.o. female.    Chief Complaint: Dysphagia and Diarrhea    The patient is known to our service from previous encounters. She was last seen by me in February of last year when seen for issues with diarrhea. The note from February 15 is in the system and reviewed for her visit today with her daughter and  present.     The patient's diarrhea last year has now improved very much according to her . We had recommended discontinuing her Metformin, and apparently this has been done. It seems she has had less problem since this was done.    The other issue is with swallowing. According to the family she has no true issue with swallowing, but eats very well. It seems, however, she refuses to swallow her medication and pockets it in her mouth. She has a history of vascular dementia and is due to see Neurology on tomorrow. It is not a true issue for us to entertain, as the function of her swallowing is obviously intact. They are concerned with considering Hospice, and I relayed to them that this will likely better addressed after input from Neurology. They have an appointment on tomorrow and they are urged to keep it as this will all be better addressed after their input. If it turns out that the swallowing issue presents enough of a problem that it will require a feeding tube we can revisit. Other options would include mixing medication in her food especially since hospice is on the table with Dementia as its basis.         Review of Systems   Constitutional: Positive for fatigue. Negative for activity change, appetite change, chills, diaphoresis, fever and unexpected weight change.   HENT: Negative for congestion, ear discharge, ear pain, hearing loss, nosebleeds, postnasal drip and tinnitus.    Eyes: Negative for photophobia and visual disturbance.   Respiratory: Positive for cough. Negative for apnea, choking, chest tightness, shortness of breath and  wheezing.    Cardiovascular: Negative for chest pain, palpitations and leg swelling.   Gastrointestinal: Negative for abdominal distention, abdominal pain, anal bleeding, blood in stool, constipation, diarrhea, nausea, rectal pain and vomiting.   Genitourinary: Negative for difficulty urinating, dyspareunia, dysuria, flank pain, frequency, hematuria, menstrual problem, pelvic pain, urgency, vaginal bleeding and vaginal discharge.   Musculoskeletal: Positive for back pain, gait problem and joint swelling. Negative for arthralgias, myalgias and neck stiffness.        Joint stiffness   Skin: Positive for color change and rash. Negative for pallor.        Pruritus   Neurological: Negative for dizziness, tremors, seizures, syncope, speech difficulty, weakness, numbness and headaches.   Hematological: Negative for adenopathy.   Psychiatric/Behavioral: Negative for agitation, confusion, hallucinations, sleep disturbance and suicidal ideas.       Objective:      Physical Exam    Assessment:   Binswanger's disease  Diarrhea         Much improved off Metformin  S/P CVA  Multiple other medical problems   No diagnosis found.    Plan:   Keep Neurology appointment  No plan for further workup based on information given today as it is likely dementia related. Await Neurology input regarding Hospice abd addressing of difficulty with patient taking meds.

## 2019-03-26 ENCOUNTER — OFFICE VISIT (OUTPATIENT)
Dept: NEUROLOGY | Facility: CLINIC | Age: 79
End: 2019-03-26
Payer: MEDICARE

## 2019-03-26 VITALS
HEIGHT: 64 IN | HEART RATE: 72 BPM | BODY MASS INDEX: 28.15 KG/M2 | DIASTOLIC BLOOD PRESSURE: 68 MMHG | SYSTOLIC BLOOD PRESSURE: 132 MMHG | WEIGHT: 164.88 LBS

## 2019-03-26 DIAGNOSIS — I63.81: ICD-10-CM

## 2019-03-26 DIAGNOSIS — G47.33 OSA (OBSTRUCTIVE SLEEP APNEA): ICD-10-CM

## 2019-03-26 DIAGNOSIS — I49.8 SINUS ARRHYTHMIA: ICD-10-CM

## 2019-03-26 DIAGNOSIS — E11.65 TYPE 2 DIABETES MELLITUS WITH HYPERGLYCEMIA, UNSPECIFIED WHETHER LONG TERM INSULIN USE: ICD-10-CM

## 2019-03-26 DIAGNOSIS — Z95.0 CARDIAC PACEMAKER IN SITU: Chronic | ICD-10-CM

## 2019-03-26 DIAGNOSIS — I49.5 SINUS NODE DYSFUNCTION: Chronic | ICD-10-CM

## 2019-03-26 DIAGNOSIS — N28.1 RENAL CYST, RIGHT: ICD-10-CM

## 2019-03-26 DIAGNOSIS — F41.9 ANXIETY: Chronic | ICD-10-CM

## 2019-03-26 DIAGNOSIS — F01.518 VASCULAR DEMENTIA WITH BEHAVIOR DISTURBANCE: Primary | ICD-10-CM

## 2019-03-26 DIAGNOSIS — Z79.01 ANTICOAGULATED: ICD-10-CM

## 2019-03-26 DIAGNOSIS — R06.09 DOE (DYSPNEA ON EXERTION): ICD-10-CM

## 2019-03-26 DIAGNOSIS — E78.5 HYPERLIPIDEMIA, UNSPECIFIED HYPERLIPIDEMIA TYPE: ICD-10-CM

## 2019-03-26 DIAGNOSIS — M35.3 POLYMYALGIA RHEUMATICA: ICD-10-CM

## 2019-03-26 DIAGNOSIS — E78.5 DYSLIPIDEMIA: Chronic | ICD-10-CM

## 2019-03-26 DIAGNOSIS — I10 ESSENTIAL (PRIMARY) HYPERTENSION: Chronic | ICD-10-CM

## 2019-03-26 DIAGNOSIS — G45.9 TRANSIENT CEREBRAL ISCHEMIA, UNSPECIFIED TYPE: ICD-10-CM

## 2019-03-26 DIAGNOSIS — K76.0 FATTY LIVER: ICD-10-CM

## 2019-03-26 DIAGNOSIS — E11.9 TYPE 2 DIABETES MELLITUS WITHOUT COMPLICATION, WITHOUT LONG-TERM CURRENT USE OF INSULIN: Chronic | ICD-10-CM

## 2019-03-26 DIAGNOSIS — I48.0 AF (PAROXYSMAL ATRIAL FIBRILLATION): Chronic | ICD-10-CM

## 2019-03-26 DIAGNOSIS — M25.552 PAIN OF LEFT HIP JOINT: ICD-10-CM

## 2019-03-26 DIAGNOSIS — G45.1 CAROTID ARTERY SYNDROME: ICD-10-CM

## 2019-03-26 DIAGNOSIS — I67.3: ICD-10-CM

## 2019-03-26 PROBLEM — N39.0 URINARY TRACT INFECTION WITHOUT HEMATURIA: Status: RESOLVED | Noted: 2019-02-25 | Resolved: 2019-03-26

## 2019-03-26 PROBLEM — R19.7 DIARRHEA: Status: RESOLVED | Noted: 2019-02-25 | Resolved: 2019-03-26

## 2019-03-26 PROCEDURE — 3075F PR MOST RECENT SYSTOLIC BLOOD PRESS GE 130-139MM HG: ICD-10-PCS | Mod: CPTII,S$GLB,, | Performed by: PSYCHIATRY & NEUROLOGY

## 2019-03-26 PROCEDURE — 3075F SYST BP GE 130 - 139MM HG: CPT | Mod: CPTII,S$GLB,, | Performed by: PSYCHIATRY & NEUROLOGY

## 2019-03-26 PROCEDURE — 1101F PT FALLS ASSESS-DOCD LE1/YR: CPT | Mod: CPTII,S$GLB,, | Performed by: PSYCHIATRY & NEUROLOGY

## 2019-03-26 PROCEDURE — 99999 PR PBB SHADOW E&M-EST. PATIENT-LVL III: CPT | Mod: PBBFAC,,, | Performed by: PSYCHIATRY & NEUROLOGY

## 2019-03-26 PROCEDURE — 99204 OFFICE O/P NEW MOD 45 MIN: CPT | Mod: S$GLB,,, | Performed by: PSYCHIATRY & NEUROLOGY

## 2019-03-26 PROCEDURE — 99999 PR PBB SHADOW E&M-EST. PATIENT-LVL III: ICD-10-PCS | Mod: PBBFAC,,, | Performed by: PSYCHIATRY & NEUROLOGY

## 2019-03-26 PROCEDURE — 3078F DIAST BP <80 MM HG: CPT | Mod: CPTII,S$GLB,, | Performed by: PSYCHIATRY & NEUROLOGY

## 2019-03-26 PROCEDURE — 1101F PR PT FALLS ASSESS DOC 0-1 FALLS W/OUT INJ PAST YR: ICD-10-PCS | Mod: CPTII,S$GLB,, | Performed by: PSYCHIATRY & NEUROLOGY

## 2019-03-26 PROCEDURE — 99204 PR OFFICE/OUTPT VISIT, NEW, LEVL IV, 45-59 MIN: ICD-10-PCS | Mod: S$GLB,,, | Performed by: PSYCHIATRY & NEUROLOGY

## 2019-03-26 PROCEDURE — 3078F PR MOST RECENT DIASTOLIC BLOOD PRESSURE < 80 MM HG: ICD-10-PCS | Mod: CPTII,S$GLB,, | Performed by: PSYCHIATRY & NEUROLOGY

## 2019-03-26 RX ORDER — MEMANTINE HYDROCHLORIDE 10 MG/1
10 TABLET ORAL 2 TIMES DAILY
Qty: 60 TABLET | Refills: 5 | Status: SHIPPED | OUTPATIENT
Start: 2019-03-26 | End: 2020-03-25

## 2019-03-26 NOTE — PATIENT INSTRUCTIONS
Understanding Dementia  Dementia is the name for a group of brain conditions that make it harder to remember, reason, and communicate. The most common form of dementia is Alzheimer disease. Other types include vascular dementia, frontotemporal dementia, and Lewy body dementia. Years ago, dementia was often called senility. It was even thought to be a normal part of aging. We now know that its not normal. Its caused by ongoing damage to cells in the brain.    Symptoms of dementia  Symptoms differ depending on which parts of the brain are affected and the stage of the disease. The most common symptoms include:  · Memory loss, including trouble with directions and familiar tasks  · Language problems, such as trouble getting words out or understanding what is said  · Difficulty with planning, organizing, concentration, and judgment. This includes people not being able to recognize their own symptoms.  · Changes in behavior and personality  How dementia affects the brain  The brain controls all the workings of the mind and body. Some parts of the brain control memory and language. Other parts control movement and coordination. With dementia, nerve cells in the brain are gradually damaged or destroyed. Why this happens is not yet clear. But over time, parts of the brain begin to atrophy (shrink). Brain atrophy often starts in the part of the brain that controls memory, reasoning, and personality. Other parts of the brain may not be affected until much later in the illness.  The stages of dementia  Dementia is a progressive disease. This means it gets worse over time. Symptoms differ for each person, but there are 3 basic stages. Each may last from months to years:  · In the early stage, a person may seem forgetful, confused, or have changes in behavior. However, he or she may still be able to handle most tasks without help.  · In the middle stage, more and more help is needed with daily tasks. A person may have  trouble recognizing friends and family members, wander, or get lost in familiar places. He or she may also become restless or kelly.  · In the late stage, Alzheimers can cause severe problems with memory, judgment, and other skills. Help is needed with nearly every aspect of daily life.  Treating dementia  At present, theres no cure for dementia. But with proper care, many people can live comfortably for years:   · Medicines are a key part of treatment. Some types can help slow the progression of symptoms, such as memory loss. Others can help ease mood, behavior, and sleep problems. These medicines work for some people but not all.  · Activity and exercise are good for body and mind. They may even help slow the progression of the disease. Simple, repetitive activities are good choices.  · Regular healthcare provider visits help keep track of symptoms and overall health.  · Sleep-wake cycle can be mixed up in patients with dementia. They may function better being up at nighttime and sleeping during the daytime.    · Social interactions are important to maintain.    Date Last Reviewed: 10/7/2015  © 8800-8281 [employer]. [address] [place] [address]. All rights reserved. This information is not intended as a substitute for professional medical care. Always follow your healthcare professional's instructions.        Dementia: Coping Tips for Caregivers  When someone you love has dementia, its normal to want to do as much as you can to help. But you cant take good care of someone else if you dont take care of yourself, too. So be sure to take breaks when you need them. Its not selfish. Its essential. Get out to see friends. Eat right. And be sure to visit your own doctor for regular checkups. Most of all, accept that you cant do everything yourself.    Make time for you  Its vital for you to spend time outside your role as caregiver. It may not feel right at first. But even simple  things can ease stress and keep you refreshed. Try the following:  · Go to a movie or concert.  · Go to the gym.  · Have a meal with friends.  · Take a walk.  · Read a book or write in a journal.  · Pursue a hobby.  Talk to others  Talking to others is often a big stress reliever. Sometimes you just need a friend or family member to listen. At other times, you may want to talk to a professional you trust. This could be a counselor, , , or therapist. Another good option is joining a local support group for caregivers. Joining a support group can help you feel that you arent alone. Its also reassuring to share thoughts and ideas with others who are going through the same things as you.  Get temporary help  Finding time away isnt always easy. But you do have options for respite care (temporary help). Draw on support from family and friends. And accept help when its offered. People who care about you and your loved one really do want to help. Try these tips:  · Ask a friend to spend the evening with your loved one.  · Hire a home healthcare worker for regular breaks.  · Take your loved one to adult day care.  · Have family or friends help by shopping or bringing over a meal once a week.  · Contact local support agencies or a  for respite care recommendations.  Accept your emotions  The stress of caregiving can seem overwhelming at times. You may feel frustrated, sad, or resentful. This isnt a sign youre doing something wrong. Its completely normal. So accept these emotions as they come. However, if you find yourself feeling hopeless, tired, sad, or guilty most of the time, talk to your healthcare provider. These feelings may be signs of depression, which can and should be treated.  Know when to make a change  The time may come when you can no longer care for your loved one safely. It may be that he or she requires more supervision. Or, you may find it too hard to cope with  the daily stresses of caregiving. No matter the reason, its OK to make a change. It doesnt mean youve failed. Changing the situation may be best for everyone. Youll still be able to spend plenty of quality time with your loved one.  Date Last Reviewed: 7/1/2016  © 2242-6611 [employer]. [address] [place] [address], [place] [address]. All rights reserved. This information is not intended as a substitute for professional medical care. Always follow your healthcare professional's instructions.        The Difference Between Delirium and Dementia  Dementia and delirium are both health conditions that change a persons ability to think clearly and care for themselves. They do share some similar signs and symptoms. But they have different causes, treatment, and outcomes.  Delirium is seen as a medical emergency that needs to be treated right away. But it can often be mistaken for dementia. In some cases, these conditions can occur at the same time. Learn how the two are different, and what you can do to help a person who has signs of either or both.  What is delirium?  Delirium is a sudden change in a persons mental state that fluctuates over short periods of time. A person will have trouble paying attention or following a conversation. Thinking and speech may be confused, illogical, unclear, and unpredictable.  A persons mental state may vary from agitated and watchful to sluggish and sleepy.  Delirium is a medical emergency. Usually, there is some underlying cause.  Delirium is treated by finding the cause. Once the cause is treated, the delirium can go away.  What is dementia?  Dementia is a range of signs that a persons brain is losing function. With dementia, a persons ability to think, remember, and communicate with others gets weaker over time. This process occurs over years and usually progresses slowly. At first, a person may sometimes be forgetful or confused. Questions will be asked over and  over. Basic information may be forgotten. Over time, he or she will have trouble following directions and doing daily tasks. The person will have trouble talking with and understanding others. Eventually, a person with dementia may forget who people are and not know where he or she is. The person may also be kelly or restless.  Knowing the difference    Dementia Delirium   Common signs Signs include forgetfulness and confusion. The person will have trouble speaking with and understanding others. This occurs over long periods of time. Signs include sudden changes in mental state. Changes may range from agitation to tiredness.   When signs appear There is a slow change in mental state and behavior over months or years. There is a sudden change in mental state and behavior over hours or days.   Thinking and attention The person may often seem confused. Over time, the persons thinking will not be sensible or logical. As the disease worsens, the person will not be able to focus well. He or she will often not be able to talk with or understand others clearly. In some cases, the person may see or hear things that others cant (hallucinations). The person will not be able to remember events that just happened, and lose memory of events in the past. The person may not remember who people are, or where they are. The person may not recognize common objects. The person may be confused and disoriented. The person may have trouble focusing and talking with others. It is likely that the person will not be able to tell a healthcare provider about his or her symptoms. The person may see or hear things that others cant (hallucinations). The person may not be able to remember something that just happened.   Getting a diagnosis  A person with signs of dementia or delirium will need to be diagnosed correctly. In some cases, you can help. You can tell the healthcare provider how the person's mental state is different from their normal  state. This can help the provider diagnose the problem.  Healthcare providers will look at different parts of a person's health. They will look at what medicine a person is taking. They will find out if the person has an infection, or if he or she has an illness that has gotten worse. They may talk with the person to learn more about his or her mental state. And they may do tests to see if there may be a cause for delirium.  When to get medical help  Someone with dementia may also have signs of delirium. This can show that there is another problem.  If someone--with or without dementia--has sudden changes in mental state, call his or her health care provider right away. Or call 911 or your local emergency number. Tell the health care provider about the signs of delirium you have seen.  Date Last Reviewed: 4/1/2017 © 2000-2017 [employer]. [address] [place] [address]. All rights reserved. This information is not intended as a substitute for professional medical care. Always follow your healthcare professional's instructions.        Memantine Tablets  What is this medicine?  MEMANTINE (MEM an teen) is used to treat dementia caused by Alzheimer's disease.  How should I use this medicine?  Take this medicine by mouth with a glass of water. Follow the directions on the prescription label. You may take this medicine with or without food. Take your doses at regular intervals. Do not take your medicine more often than directed. Continue to take your medicine even if you feel better. Do not stop taking except on the advice of your doctor or health care professional.  Talk to your pediatrician regarding the use of this medicine in children. Special care may be needed.  What side effects may I notice from receiving this medicine?  Side effects that you should report to your doctor or health care professional as soon as possible:  · allergic reactions like skin rash, itching or hives, swelling of  the face, lips, or tongue  · agitation or a feeling of restlessness  · depressed mood  · dizziness  · hallucinations  · redness, blistering, peeling or loosening of the skin, including inside the mouth  · seizures  · vomiting  Side effects that usually do not require medical attention (report to your doctor or health care professional if they continue or are bothersome):  · constipation  · diarrhea  · headache  · nausea  · trouble sleeping  What may interact with this medicine?  · acetazolamide  · amantadine  · cimetidine  · dextromethorphan  · dofetilide  · hydrochlorothiazide  · ketamine  · metformin  · methazolamide  · quinidine  · ranitidine  · sodium bicarbonate  · triamterene  What if I miss a dose?  If you miss a dose, take it as soon as you can. If it is almost time for your next dose, take only that dose. Do not take double or extra doses. If you do not take your medicine for several days, contact your health care provider. Your dose may need to be changed.  Where should I keep my medicine?  Keep out of the reach of children.  Store at room temperature between 15 degrees and 30 degrees C (59 degrees and 86 degrees F). Throw away any unused medicine after the expiration date.  What should I tell my health care provider before I take this medicine?  They need to know if you have any of these conditions:  · difficulty passing urine  · kidney disease  · liver disease  · seizures  · an unusual or allergic reaction to memantine, other medicines, foods, dyes, or preservatives  · pregnant or trying to get pregnant  · breast-feeding  What should I watch for while using this medicine?  Visit your doctor or health care professional for regular checks on your progress. Check with your doctor or health care professional if there is no improvement in your symptoms or if they get worse.  You may get drowsy or dizzy. Do not drive, use machinery, or do anything that needs mental alertness until you know how this drug  affects you. Do not stand or sit up quickly, especially if you are an older patient. This reduces the risk of dizzy or fainting spells. Alcohol can make you more drowsy and dizzy. Avoid alcoholic drinks.  NOTE:This sheet is a summary. It may not cover all possible information. If you have questions about this medicine, talk to your doctor, pharmacist, or health care provider. Copyright© 2017 [employer]

## 2019-03-26 NOTE — PROGRESS NOTES
Subjective:       Patient ID: Tatiana Chaparro is a 78 y.o. female.    Chief Complaint: Dementia    HPI         The patient I here for memory loss. The patient is presenting with 5-year history of memory loss that has gotten much worse after the 2016 RT Thalamic Stroke.The patient is accompanied by her  and daughter.The main problems the patient has are related to progressive short term memory loss. For example, the patient would repeat the same question over and over again and forgets entire conversations. The patient also started forgetting names. Has significant trouble remembering the date and time, keeping up with medications and appointments and keeping up with major holidays and political changes. The patient is dependent in handling finances. The patient is totally dependent with ADLs. No hallucinations or delusions but has become more emotional. No seizures. She stopped walking due to severe pain especially in the LT hip. Appetite and sleep are good. CTH on 03- showed atrophy with PVWMD with no acute changes.      Review of Systems   Constitutional: Positive for fatigue. Negative for appetite change.   HENT: Negative for hearing loss and tinnitus.    Eyes: Negative for photophobia and visual disturbance.   Respiratory: Negative for apnea and shortness of breath.    Cardiovascular: Negative for chest pain and palpitations.   Gastrointestinal: Negative for nausea and vomiting.   Endocrine: Negative for cold intolerance and heat intolerance.   Genitourinary: Negative for difficulty urinating and urgency.        Incontinent    Musculoskeletal: Positive for arthralgias, back pain and gait problem. Negative for joint swelling, myalgias, neck pain and neck stiffness.   Skin: Negative for color change and rash.   Allergic/Immunologic: Negative for environmental allergies and immunocompromised state.   Neurological: Positive for numbness. Negative for dizziness, tremors, seizures, syncope, facial  asymmetry, speech difficulty, weakness, light-headedness and headaches.   Hematological: Negative for adenopathy. Does not bruise/bleed easily.   Psychiatric/Behavioral: Positive for confusion. Negative for agitation, behavioral problems, decreased concentration, dysphoric mood, hallucinations, self-injury, sleep disturbance and suicidal ideas. The patient is not nervous/anxious and is not hyperactive.          Current Outpatient Medications:     alprazolam (XANAX) 0.5 MG tablet, Take 0.5 mg by mouth every evening. , Disp: , Rfl:     apixaban (ELIQUIS) 5 mg Tab, TAKE ONE TABLET BY MOUTH TWICE DAILY **THANK YOU**, Disp: 60 tablet, Rfl: 5    atenolol (TENORMIN) 50 MG tablet, TAKE 1/2 TABLET BY MOUTH ONCE DAILY THANK YOU, Disp: , Rfl: 4    atorvastatin (LIPITOR) 10 MG tablet, Take 10 mg by mouth once daily., Disp: , Rfl:     diphenoxylate-atropine 2.5-0.025 mg (LOMOTIL) 2.5-0.025 mg per tablet, TAKE ONE TABLET BY MOUTH ONCE DAILY AS NEEDED THANK YOU, Disp: , Rfl: 0    gabapentin (NEURONTIN) 100 MG capsule, Take 100 mg by mouth 2 (two) times daily. Takes one tablet by mouth in the morning and two tablets at night, Disp: , Rfl:     glimepiride (AMARYL) 1 MG tablet, Take 1 mg by mouth Daily., Disp: , Rfl:     hydroCHLOROthiazide (HYDRODIURIL) 25 MG tablet, Take 1 tablet (25 mg total) by mouth once daily., Disp: 30 tablet, Rfl: 0    MYRBETRIQ 25 mg Tb24 ER tablet, Take 25 mg by mouth nightly. at bedtime., Disp: , Rfl: 5    predniSONE (DELTASONE) 5 MG tablet, Take 1.5 tablets (7.5 mg total) by mouth once daily., Disp: 45 tablet, Rfl: 1    quinapril (ACCUPRIL) 40 MG tablet, Take 40 mg by mouth 2 (two) times daily., Disp: , Rfl:     sertraline (ZOLOFT) 25 MG tablet, , Disp: , Rfl:     traMADol (ULTRAM) 50 mg tablet, Take 1 tablet (50 mg total) by mouth every 6 (six) hours as needed for Pain., Disp: 12 tablet, Rfl: 0    memantine (NAMENDA) 10 MG Tab, Take 1 tablet (10 mg total) by mouth 2 (two) times daily.,  Disp: 60 tablet, Rfl: 5  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    Carotid artery syndrome 7/5/2016    Chronic back pain     Essential (primary) hypertension     Hyperlipemia 7/18/2016    Hypertension     Lacunar stroke of right subthalamic region     OSVALDO (obstructive sleep apnea) 7/18/2016    Paroxysmal atrial fibrillation 7/4/2016    Transient cerebral ischemia 7/4/2016    Type 2 diabetes mellitus without complication      Past Surgical History:   Procedure Laterality Date    APPENDECTOMY      CARDIAC PACEMAKER PLACEMENT      CHOLECYSTECTOMY      HYSTERECTOMY      mid-urethral sling      REPLACEMENT-GENERATOR-PACEMAKER Left 4/27/2017    Performed by Kendrick Dias MD at [facility] CATH LAB     Social History     Socioeconomic History    Marital status:      Spouse name: Not on file    Number of children: Not on file    Years of education: Not on file    Highest education level: Not on file   Occupational History    Not on file   Social Needs    Financial resource strain: Not on file    Food insecurity:     Worry: Not on file     Inability: Not on file    Transportation needs:     Medical: Not on file     Non-medical: Not on file   Tobacco Use    Smoking status: Never Smoker   Substance and Sexual Activity    Alcohol use: No    Drug use: No    Sexual activity: Never   Lifestyle    Physical activity:     Days per week: Not on file     Minutes per session: Not on file    Stress: Not on file   Relationships    Social connections:     Talks on phone: Not on file     Gets together: Not on file     Attends [employer] service: Not on file     Active member of club or organization: Not on file     Attends meetings of clubs or organizations: Not on file     Relationship status: Not on file    Intimate partner violence:     Fear of current or ex partner: Not on file     Emotionally abused: Not on file      Physically abused: Not on file     Forced sexual activity: Not on file   Other Topics Concern    Not on file   Social History Narrative    Not on file       Objective:     Vital signs reviewed     GENERAL APPEARANCE:     The patient looks uncomfortable    Normal breathing pattern.    No dysmorphic features    No eye contact.     GENERAL MEDICAL EXAM:    HEENT:  Head is atraumatic normocephalic. No tender temporal arteries.     Neck and Axillae: No JVD. No carotid bruits. No thyromegaly. No lymphadenopathy.    Cardiopulmonary: No cyanosis. No tachypnea. Normal respiratory effort.  Clear breath sounds.PPM and no murmurs.    Gastrointestinal:  No stomas or lesions. No hernias.  Abdomen is soft non-tender. No masses or organomegaly.    Skin, Hair and Nails: No pathognonomic skin rash. No neurofibromatosis.   No stigmata of autoimmune disease.     Limbs: No varicose veins. Trace edema. Symmetric pulses.     Muskoskeletal: OA deformities.No spine tenderness. LT Hip TTT  No signs of longstanding neuropathy. No dislocations or fractures.        Neurologic Exam     Mental Status   Disoriented to person.   Disoriented to place. Oriented to country, city and street.   Disoriented to time. Disoriented to year, month, date, day and season.   Registration: recalls 3 of 3 objects. Recall of objects at 5 minutes: 0/3. Follows 3 step commands.   Attention: decreased. Concentration: decreased.   Speech: slurred   Level of consciousness: drowsy  Knowledge: poor. Unable to perform simple calculations.   Able to name object. Able to repeat. Normal comprehension.     Disoriented to current president (said [place])     Disoriented to Time and Date          Cranial Nerves     CN II   Visual fields full to confrontation.   Visual acuity: normal  Right visual field deficit: none  Left visual field deficit: none     CN III, IV, VI   Pupils are equal, round, and reactive to light.  Extraocular motions are normal.   Right pupil: Size: 2 mm.  Shape: regular. Reactivity: brisk. Consensual response: intact. Accommodation: intact.   Left pupil: Size: 2 mm. Shape: regular. Reactivity: brisk. Consensual response: intact. Accommodation: intact.   CN III: no CN III palsy  CN VI: no CN VI palsy  Nystagmus: none   Diplopia: none  Ophthalmoparesis: none  Upgaze: normal  Downgaze: normal  Conjugate gaze: present  Vestibulo-ocular reflex: present    CN V   Right facial sensation deficit: none  Left facial sensation deficit: complete  Right corneal reflex: normal  Left corneal reflex: normal    CN VII   Right facial weakness: none  Left facial weakness: none  Right taste: normal  Left taste: normal    CN VIII   CN VIII normal.   Hearing: intact  Right Rinne: AC > BC  Left Rinne: AC > BC  Sarkar: does not lateralize     CN IX, X   CN IX normal.   CN X normal.   Palate: symmetric  Right gag reflex: normal  Left gag reflex: normal    CN XI   CN XI normal.   Right sternocleidomastoid strength: normal  Left sternocleidomastoid strength: normal  Right trapezius strength: normal  Left trapezius strength: normal    CN XII   CN XII normal.   Tongue: not atrophic  Fasciculations: absent  Tongue deviation: none    Motor Exam   Muscle bulk: decreased  Right arm tone: normal  Left arm tone: increased  Right arm pronator drift: absent  Left arm pronator drift: present  Right leg tone: normal  Left leg tone: increased    Strength   Right neck flexion: 4/5  Left neck flexion: 4/5  Right neck extension: 4/5  Left neck extension: 4/5  Right deltoid: 4/5  Left deltoid: 4/5  Right biceps: 4/5  Left biceps: 4/5  Right triceps: 4/5  Left triceps: 4/5  Right wrist flexion: 4/5  Left wrist flexion: 4/5  Right wrist extension: 4/5  Left wrist extension: 4/5  Right interossei: 4/5  Left interossei: 4/5  Right iliopsoas: 4/5  Left iliopsoas: 3/5  Right quadriceps: 4/5  Left quadriceps: 3/5  Right hamstrin/5  Left hamstring: 3/5  Right glutei: 4/5  Left glutei: 3/5  Right anterior tibial:  4/5  Left anterior tibial: 3/5  Right posterior tibial: 4/5  Left posterior tibial: 3/5  Right peroneal: 4/5  Left peroneal: 3/5  Right gastroc: 4/5  Left gastroc: 3/5  Giveway weakness in the LLE due to hip pain      Sensory Exam   Right arm light touch: normal  Left arm light touch: decreased from elbow  Right leg light touch: normal  Left leg light touch: decreased from knee  Vibration normal.   Right arm vibration: normal  Left arm vibration: normal  Right leg vibration: normal  Left leg vibration: normal  Proprioception normal.   Right arm proprioception: normal  Left arm proprioception: normal  Right leg proprioception: normal  Left leg proprioception: normal  Right arm pinprick: normal  Left arm pinprick: decreased from elbow  Right leg pinprick: normal  Left leg pinprick: decreased from knee  Sensory deficit distribution on left: non-dermatomal distribution  Graphesthesia: abnormal left  Stereognosis: abnormal left    Gait, Coordination, and Reflexes     Tremor   Resting tremor: absent  Intention tremor: absent  Action tremor: absent    Reflexes   Right brachioradialis: 2+  Left brachioradialis: 3+  Right biceps: 2+  Left biceps: 3+  Right triceps: 2+  Left triceps: 3+  Right patellar: 1+  Left patellar: 2+  Right achilles: 0  Left achilles: 1+  Right : 2+  Right plantar: normal  Left plantar: upgoing  Right Fierro: absent  Left Fierro: absent  Right ankle clonus: absent  Left ankle clonus: absent  Right pendular knee jerk: absent  Left pendular knee jerk: absent      Lab Results   Component Value Date    WBC 10.58 02/25/2019    HGB 13.5 02/25/2019    HCT 41.6 02/25/2019    MCV 95 02/25/2019     02/25/2019     Sodium   Date Value Ref Range Status   02/25/2019 139 136 - 145 mmol/L Final     Potassium   Date Value Ref Range Status   02/25/2019 3.9 3.5 - 5.1 mmol/L Final     Chloride   Date Value Ref Range Status   02/25/2019 103 95 - 110 mmol/L Final     CO2   Date Value Ref Range Status    02/25/2019 27 23 - 29 mmol/L Final     Glucose   Date Value Ref Range Status   02/25/2019 206 (H) 70 - 110 mg/dL Final     BUN, Bld   Date Value Ref Range Status   02/25/2019 25 (H) 8 - 23 mg/dL Final     Creatinine   Date Value Ref Range Status   02/25/2019 1.3 0.5 - 1.4 mg/dL Final     Calcium   Date Value Ref Range Status   02/25/2019 9.2 8.7 - 10.5 mg/dL Final     Total Protein   Date Value Ref Range Status   02/25/2019 6.2 6.0 - 8.4 g/dL Final     Albumin   Date Value Ref Range Status   02/25/2019 2.9 (L) 3.5 - 5.2 g/dL Final     Total Bilirubin   Date Value Ref Range Status   02/25/2019 0.4 0.1 - 1.0 mg/dL Final     Comment:     For infants and newborns, interpretation of results should be based  on gestational age, weight and in agreement with clinical  observations.  Premature Infant recommended reference ranges:  Up to 24 hours.............<8.0 mg/dL  Up to 48 hours............<12.0 mg/dL  3-5 days..................<15.0 mg/dL  6-29 days.................<15.0 mg/dL       Alkaline Phosphatase   Date Value Ref Range Status   02/25/2019 57 55 - 135 U/L Final     AST   Date Value Ref Range Status   02/25/2019 20 10 - 40 U/L Final     ALT   Date Value Ref Range Status   02/25/2019 16 10 - 44 U/L Final     Anion Gap   Date Value Ref Range Status   02/25/2019 9 8 - 16 mmol/L Final     eGFR if    Date Value Ref Range Status   02/25/2019 45 (A) >60 mL/min/1.73 m^2 Final     eGFR if non    Date Value Ref Range Status   02/25/2019 39 (A) >60 mL/min/1.73 m^2 Final     Comment:     Calculation used to obtain the estimated glomerular filtration  rate (eGFR) is the CKD-EPI equation.        Lab Results   Component Value Date    RIOPHDUZ15 428 07/05/2016     Lab Results   Component Value Date    TSH 0.819 06/22/2015       Reviewed the neuroimaging independently     03-    CTH showed atrophy with PVWMD with no acute changes.            Assessment:       1. Vascular dementia with  behavior disturbance    2. Dyslipidemia    3. Anxiety    4. AF (paroxysmal atrial fibrillation)    5. Essential (primary) hypertension    6. Type 2 diabetes mellitus without complication, without long-term current use of insulin    7. Sinus node dysfunction    8. Cardiac pacemaker in situ    9. Transient cerebral ischemia, unspecified type    10. Type 2 diabetes mellitus with hyperglycemia, unspecified whether long term insulin use    11. 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 Lacunar stroke of right subthalamic region    14. Hyperlipidemia, unspecified hyperlipidemia type    15. Anticoagulated    16. OSVALDO (obstructive sleep apnea)    17. BROOKE (dyspnea on exertion)    18. Sinus arrhythmia    19. Polymyalgia rheumatica    20. Renal cyst, right    21. Fatty liver    22. Pain of left hip joint        Plan:         VD    The patient needs NH placement. Discussed this at length with the family.     Will start memantine/Namenda and titrate slowly to 10 mg BID over 4 weeks. Will start 7 mg and increase by 7 mg very week. The side effects include dizziness, seizures and nightmares and discussed with patient and family.    After that will start donepezil/Aricept and titrate slowly to 10 mg QHS .Will start at 5 mg QHS. The side effects include dizziness, diarrhea and nightmares and discussed with patient and family. If GI symptoms become an issue will try rivastigmine/Exelon patches.    Falling Down Precautions    Avoid antihistamines and anticholinergics.    Avoid changing routine.    The family was made aware  and given several resources.      PREVENTION OF DELIRIUM     1. Good hydration and avoid electrolyte imbalance  2. Recognize andtreat infections immediately especially UTI.  3. Bladder emptying and prevent constipation.   4. Provide stimulating activities and familiar objects  5. Use eyeglasses and hearing aids if needed.   6. Use simple and regular communication about people, current place and  time  7. Mobility and range-of-motion exercises  8. Reduce noise, lighting and avoid sleep interruptions  9. Non-narcotic pain management.  10.Nondrug treatment for sleep problems or anxiety  11. Avoid antihistamines.  12. Avoid narcotics.  13. Avoid benzodiazepines.          MEDICAL/SURGICAL COMORBIDITIES     All relevant medical comorbidities noted and managed by primary care physician and medical care team.          MISCELLANEOUS     Refer to Pain Management to help with pain control         RTC in 4 weeks                     Angel Garcia MD, FAAN    Attending Neurologist/Epileptologist         Diplomate, American Board-Psychiatry and Neurology (Neurology)    Diplomate, American Board-Clinical Neurophysiology (Epilpesy-Neuromuscular)     Fellow, American Academy of Neurology

## 2019-03-26 NOTE — LETTER
March 26, 2019      Kendrick Buckner MD  [address] [facility] [address]           [facility]  [address] [facility] [address]  Phone: [phone]  Fax: [phone]          Patient: Tatiana Chaparro   MR Number: [id number]   YOB: 1940   Date of Visit: 3/26/2019       Dear Dr. Kendrick Buckner:    Thank you for referring Tatiana Chaparro to me for evaluation. Attached you will find relevant portions of my assessment and plan of care.    If you have questions, please do not hesitate to call me. I look forward to following Tatiana Chaparro along with you.    Sincerely,    Angel Garcia MD    Enclosure  CC:  No Recipients    If you would like to receive this communication electronically, please contact [email or web address] or [phone] to request more information on [employer] access.    For providers and/or their staff who would like to refer a patient to [facility], please contact us through our one-stop-shop provider referral line, [facility], at [phone].    If you feel you have received this communication in error or would no longer like to receive these types of communications, please e-mail [email or web address]

## 2019-04-30 ENCOUNTER — TELEPHONE (OUTPATIENT)
Dept: ADMINISTRATIVE | Facility: CLINIC | Age: 79
End: 2019-04-30